# Patient Record
Sex: FEMALE | Race: OTHER | HISPANIC OR LATINO | URBAN - METROPOLITAN AREA
[De-identification: names, ages, dates, MRNs, and addresses within clinical notes are randomized per-mention and may not be internally consistent; named-entity substitution may affect disease eponyms.]

---

## 2017-06-29 ENCOUNTER — EMERGENCY (EMERGENCY)
Facility: HOSPITAL | Age: 57
LOS: 1 days | Discharge: HOME | End: 2017-06-29
Attending: EMERGENCY MEDICINE
Payer: COMMERCIAL

## 2017-06-29 VITALS
SYSTOLIC BLOOD PRESSURE: 114 MMHG | DIASTOLIC BLOOD PRESSURE: 74 MMHG | WEIGHT: 149.91 LBS | OXYGEN SATURATION: 100 % | HEIGHT: 65 IN | TEMPERATURE: 98 F | HEART RATE: 71 BPM | RESPIRATION RATE: 16 BRPM

## 2017-06-29 VITALS
DIASTOLIC BLOOD PRESSURE: 68 MMHG | HEART RATE: 68 BPM | OXYGEN SATURATION: 99 % | SYSTOLIC BLOOD PRESSURE: 120 MMHG | TEMPERATURE: 99 F | RESPIRATION RATE: 18 BRPM

## 2017-06-29 DIAGNOSIS — N30.01 ACUTE CYSTITIS WITH HEMATURIA: ICD-10-CM

## 2017-06-29 LAB
ALBUMIN SERPL ELPH-MCNC: 3.3 G/DL — LOW (ref 3.5–5)
ALP SERPL-CCNC: 72 U/L — SIGNIFICANT CHANGE UP (ref 40–120)
ALT FLD-CCNC: 18 U/L DA — SIGNIFICANT CHANGE UP (ref 10–60)
ANION GAP SERPL CALC-SCNC: 5 MMOL/L — SIGNIFICANT CHANGE UP (ref 5–17)
APPEARANCE UR: ABNORMAL
AST SERPL-CCNC: 19 U/L — SIGNIFICANT CHANGE UP (ref 10–40)
BILIRUB SERPL-MCNC: 0.4 MG/DL — SIGNIFICANT CHANGE UP (ref 0.2–1.2)
BILIRUB UR-MCNC: NEGATIVE — SIGNIFICANT CHANGE UP
BUN SERPL-MCNC: 9 MG/DL — SIGNIFICANT CHANGE UP (ref 7–18)
CALCIUM SERPL-MCNC: 9.2 MG/DL — SIGNIFICANT CHANGE UP (ref 8.4–10.5)
CHLORIDE SERPL-SCNC: 105 MMOL/L — SIGNIFICANT CHANGE UP (ref 96–108)
CO2 SERPL-SCNC: 27 MMOL/L — SIGNIFICANT CHANGE UP (ref 22–31)
COLOR SPEC: YELLOW — SIGNIFICANT CHANGE UP
CREAT SERPL-MCNC: 0.74 MG/DL — SIGNIFICANT CHANGE UP (ref 0.5–1.3)
DIFF PNL FLD: ABNORMAL
GLUCOSE SERPL-MCNC: 84 MG/DL — SIGNIFICANT CHANGE UP (ref 70–99)
GLUCOSE UR QL: NEGATIVE — SIGNIFICANT CHANGE UP
HCT VFR BLD CALC: 40.5 % — SIGNIFICANT CHANGE UP (ref 34.5–45)
HGB BLD-MCNC: 12.6 G/DL — SIGNIFICANT CHANGE UP (ref 11.5–15.5)
KETONES UR-MCNC: NEGATIVE — SIGNIFICANT CHANGE UP
LEUKOCYTE ESTERASE UR-ACNC: ABNORMAL
MCHC RBC-ENTMCNC: 26.7 PG — LOW (ref 27–34)
MCHC RBC-ENTMCNC: 31 GM/DL — LOW (ref 32–36)
MCV RBC AUTO: 86.1 FL — SIGNIFICANT CHANGE UP (ref 80–100)
NITRITE UR-MCNC: NEGATIVE — SIGNIFICANT CHANGE UP
PH UR: 6 — SIGNIFICANT CHANGE UP (ref 5–8)
PLATELET # BLD AUTO: 237 K/UL — SIGNIFICANT CHANGE UP (ref 150–400)
POTASSIUM SERPL-MCNC: 4.1 MMOL/L — SIGNIFICANT CHANGE UP (ref 3.5–5.3)
POTASSIUM SERPL-SCNC: 4.1 MMOL/L — SIGNIFICANT CHANGE UP (ref 3.5–5.3)
PROT SERPL-MCNC: 10.5 G/DL — HIGH (ref 6–8.3)
PROT UR-MCNC: NEGATIVE — SIGNIFICANT CHANGE UP
RBC # BLD: 4.7 M/UL — SIGNIFICANT CHANGE UP (ref 3.8–5.2)
RBC # FLD: 14.4 % — SIGNIFICANT CHANGE UP (ref 10.3–14.5)
SODIUM SERPL-SCNC: 137 MMOL/L — SIGNIFICANT CHANGE UP (ref 135–145)
SP GR SPEC: 1.01 — SIGNIFICANT CHANGE UP (ref 1.01–1.02)
UROBILINOGEN FLD QL: NEGATIVE — SIGNIFICANT CHANGE UP
WBC # BLD: 9.6 K/UL — SIGNIFICANT CHANGE UP (ref 3.8–10.5)
WBC # FLD AUTO: 9.6 K/UL — SIGNIFICANT CHANGE UP (ref 3.8–10.5)

## 2017-06-29 PROCEDURE — 80053 COMPREHEN METABOLIC PANEL: CPT

## 2017-06-29 PROCEDURE — 87186 SC STD MICRODIL/AGAR DIL: CPT

## 2017-06-29 PROCEDURE — 85027 COMPLETE CBC AUTOMATED: CPT

## 2017-06-29 PROCEDURE — 99284 EMERGENCY DEPT VISIT MOD MDM: CPT

## 2017-06-29 PROCEDURE — 99284 EMERGENCY DEPT VISIT MOD MDM: CPT | Mod: 25

## 2017-06-29 PROCEDURE — 96374 THER/PROPH/DIAG INJ IV PUSH: CPT

## 2017-06-29 PROCEDURE — 87086 URINE CULTURE/COLONY COUNT: CPT

## 2017-06-29 PROCEDURE — 96375 TX/PRO/DX INJ NEW DRUG ADDON: CPT

## 2017-06-29 PROCEDURE — 81001 URINALYSIS AUTO W/SCOPE: CPT

## 2017-06-29 RX ORDER — CEFUROXIME AXETIL 250 MG
1 TABLET ORAL
Qty: 20 | Refills: 0
Start: 2017-06-29 | End: 2017-07-09

## 2017-06-29 RX ORDER — KETOROLAC TROMETHAMINE 30 MG/ML
30 SYRINGE (ML) INJECTION ONCE
Qty: 0 | Refills: 0 | Status: DISCONTINUED | OUTPATIENT
Start: 2017-06-29 | End: 2017-06-29

## 2017-06-29 RX ORDER — PHENAZOPYRIDINE HCL 100 MG
100 TABLET ORAL ONCE
Qty: 0 | Refills: 0 | Status: COMPLETED | OUTPATIENT
Start: 2017-06-29 | End: 2017-06-29

## 2017-06-29 RX ORDER — CEFTRIAXONE 500 MG/1
1 INJECTION, POWDER, FOR SOLUTION INTRAMUSCULAR; INTRAVENOUS ONCE
Qty: 0 | Refills: 0 | Status: COMPLETED | OUTPATIENT
Start: 2017-06-29 | End: 2017-06-29

## 2017-06-29 RX ORDER — SODIUM CHLORIDE 9 MG/ML
1000 INJECTION INTRAMUSCULAR; INTRAVENOUS; SUBCUTANEOUS ONCE
Qty: 0 | Refills: 0 | Status: COMPLETED | OUTPATIENT
Start: 2017-06-29 | End: 2017-06-29

## 2017-06-29 RX ADMIN — Medication 30 MILLIGRAM(S): at 22:29

## 2017-06-29 RX ADMIN — Medication 30 MILLIGRAM(S): at 22:28

## 2017-06-29 RX ADMIN — Medication 100 MILLIGRAM(S): at 22:27

## 2017-06-29 RX ADMIN — SODIUM CHLORIDE 4000 MILLILITER(S): 9 INJECTION INTRAMUSCULAR; INTRAVENOUS; SUBCUTANEOUS at 22:27

## 2017-06-29 RX ADMIN — CEFTRIAXONE 100 GRAM(S): 500 INJECTION, POWDER, FOR SOLUTION INTRAMUSCULAR; INTRAVENOUS at 22:28

## 2017-06-29 NOTE — ED PROVIDER NOTE - OBJECTIVE STATEMENT
56 y/o female with PMHx of UTI presents to the ED c/o dysuria x today. Pt denies fever, chills or any other complaints. NKDA.

## 2017-06-29 NOTE — ED ADULT NURSE NOTE - OBJECTIVE STATEMENT
Pt states that she is having abd pain, with difficulty urinating that startted today int he am. Pt states that she is having chest pain, with sob that has been ongoing for years. denies nausea, vomiting.

## 2017-07-01 LAB
-  AMPICILLIN/SULBACTAM: SIGNIFICANT CHANGE UP
-  CEFAZOLIN: SIGNIFICANT CHANGE UP
-  CIPROFLOXACIN: SIGNIFICANT CHANGE UP
-  GENTAMICIN: SIGNIFICANT CHANGE UP
-  LEVOFLOXACIN: SIGNIFICANT CHANGE UP
-  NITROFURANTOIN: SIGNIFICANT CHANGE UP
-  OXACILLIN: SIGNIFICANT CHANGE UP
-  PENICILLIN: SIGNIFICANT CHANGE UP
-  RIFAMPIN: SIGNIFICANT CHANGE UP
-  TETRACYCLINE: SIGNIFICANT CHANGE UP
-  TRIMETHOPRIM/SULFAMETHOXAZOLE: SIGNIFICANT CHANGE UP
-  VANCOMYCIN: SIGNIFICANT CHANGE UP
CULTURE RESULTS: SIGNIFICANT CHANGE UP
METHOD TYPE: SIGNIFICANT CHANGE UP
ORGANISM # SPEC MICROSCOPIC CNT: SIGNIFICANT CHANGE UP
ORGANISM # SPEC MICROSCOPIC CNT: SIGNIFICANT CHANGE UP
SPECIMEN SOURCE: SIGNIFICANT CHANGE UP

## 2017-08-09 ENCOUNTER — EMERGENCY (EMERGENCY)
Facility: HOSPITAL | Age: 57
LOS: 1 days | Discharge: HOME | End: 2017-08-09
Attending: EMERGENCY MEDICINE
Payer: COMMERCIAL

## 2017-08-09 VITALS
DIASTOLIC BLOOD PRESSURE: 88 MMHG | TEMPERATURE: 98 F | RESPIRATION RATE: 18 BRPM | SYSTOLIC BLOOD PRESSURE: 126 MMHG | HEART RATE: 60 BPM | OXYGEN SATURATION: 98 %

## 2017-08-09 VITALS
SYSTOLIC BLOOD PRESSURE: 125 MMHG | HEART RATE: 62 BPM | WEIGHT: 160.06 LBS | TEMPERATURE: 98 F | OXYGEN SATURATION: 99 % | DIASTOLIC BLOOD PRESSURE: 73 MMHG | HEIGHT: 60 IN | RESPIRATION RATE: 18 BRPM

## 2017-08-09 DIAGNOSIS — Z98.891 HISTORY OF UTERINE SCAR FROM PREVIOUS SURGERY: Chronic | ICD-10-CM

## 2017-08-09 LAB
ALBUMIN SERPL ELPH-MCNC: 2.8 G/DL — LOW (ref 3.5–5)
ALP SERPL-CCNC: 81 U/L — SIGNIFICANT CHANGE UP (ref 40–120)
ALT FLD-CCNC: 12 U/L DA — SIGNIFICANT CHANGE UP (ref 10–60)
ANION GAP SERPL CALC-SCNC: 3 MMOL/L — LOW (ref 5–17)
APPEARANCE UR: CLEAR — SIGNIFICANT CHANGE UP
AST SERPL-CCNC: 20 U/L — SIGNIFICANT CHANGE UP (ref 10–40)
BACTERIA # UR AUTO: ABNORMAL /HPF
BASOPHILS # BLD AUTO: 0.1 K/UL — SIGNIFICANT CHANGE UP (ref 0–0.2)
BASOPHILS NFR BLD AUTO: 0.9 % — SIGNIFICANT CHANGE UP (ref 0–2)
BILIRUB SERPL-MCNC: 0.2 MG/DL — SIGNIFICANT CHANGE UP (ref 0.2–1.2)
BILIRUB UR-MCNC: NEGATIVE — SIGNIFICANT CHANGE UP
BUN SERPL-MCNC: 13 MG/DL — SIGNIFICANT CHANGE UP (ref 7–18)
CALCIUM SERPL-MCNC: 8.7 MG/DL — SIGNIFICANT CHANGE UP (ref 8.4–10.5)
CHLORIDE SERPL-SCNC: 109 MMOL/L — HIGH (ref 96–108)
CO2 SERPL-SCNC: 27 MMOL/L — SIGNIFICANT CHANGE UP (ref 22–31)
COLOR SPEC: YELLOW — SIGNIFICANT CHANGE UP
COMMENT - URINE: SIGNIFICANT CHANGE UP
CREAT SERPL-MCNC: 0.77 MG/DL — SIGNIFICANT CHANGE UP (ref 0.5–1.3)
DIFF PNL FLD: NEGATIVE — SIGNIFICANT CHANGE UP
EOSINOPHIL # BLD AUTO: 0.2 K/UL — SIGNIFICANT CHANGE UP (ref 0–0.5)
EOSINOPHIL NFR BLD AUTO: 3.1 % — SIGNIFICANT CHANGE UP (ref 0–6)
EPI CELLS # UR: ABNORMAL (ref 0–10)
GLUCOSE SERPL-MCNC: 117 MG/DL — HIGH (ref 70–99)
GLUCOSE UR QL: NEGATIVE — SIGNIFICANT CHANGE UP
HCT VFR BLD CALC: 36.2 % — SIGNIFICANT CHANGE UP (ref 34.5–45)
HGB BLD-MCNC: 11.3 G/DL — LOW (ref 11.5–15.5)
KETONES UR-MCNC: NEGATIVE — SIGNIFICANT CHANGE UP
LEUKOCYTE ESTERASE UR-ACNC: ABNORMAL
LIDOCAIN IGE QN: 209 U/L — SIGNIFICANT CHANGE UP (ref 73–393)
LYMPHOCYTES # BLD AUTO: 2.4 K/UL — SIGNIFICANT CHANGE UP (ref 1–3.3)
LYMPHOCYTES # BLD AUTO: 34.7 % — SIGNIFICANT CHANGE UP (ref 13–44)
MCHC RBC-ENTMCNC: 27.2 PG — SIGNIFICANT CHANGE UP (ref 27–34)
MCHC RBC-ENTMCNC: 31.2 GM/DL — LOW (ref 32–36)
MCV RBC AUTO: 87.3 FL — SIGNIFICANT CHANGE UP (ref 80–100)
MONOCYTES # BLD AUTO: 0.6 K/UL — SIGNIFICANT CHANGE UP (ref 0–0.9)
MONOCYTES NFR BLD AUTO: 9.2 % — SIGNIFICANT CHANGE UP (ref 2–14)
NEUTROPHILS # BLD AUTO: 3.6 K/UL — SIGNIFICANT CHANGE UP (ref 1.8–7.4)
NEUTROPHILS NFR BLD AUTO: 52.1 % — SIGNIFICANT CHANGE UP (ref 43–77)
NITRITE UR-MCNC: NEGATIVE — SIGNIFICANT CHANGE UP
PH UR: 5 — SIGNIFICANT CHANGE UP (ref 5–8)
PLATELET # BLD AUTO: 190 K/UL — SIGNIFICANT CHANGE UP (ref 150–400)
POTASSIUM SERPL-MCNC: 4.1 MMOL/L — SIGNIFICANT CHANGE UP (ref 3.5–5.3)
POTASSIUM SERPL-SCNC: 4.1 MMOL/L — SIGNIFICANT CHANGE UP (ref 3.5–5.3)
PROT SERPL-MCNC: 8.9 G/DL — HIGH (ref 6–8.3)
PROT UR-MCNC: NEGATIVE — SIGNIFICANT CHANGE UP
RBC # BLD: 4.14 M/UL — SIGNIFICANT CHANGE UP (ref 3.8–5.2)
RBC # FLD: 14.2 % — SIGNIFICANT CHANGE UP (ref 10.3–14.5)
RBC CASTS # UR COMP ASSIST: SIGNIFICANT CHANGE UP /HPF (ref 0–2)
SODIUM SERPL-SCNC: 139 MMOL/L — SIGNIFICANT CHANGE UP (ref 135–145)
SP GR SPEC: 1.01 — SIGNIFICANT CHANGE UP (ref 1.01–1.02)
UROBILINOGEN FLD QL: NEGATIVE — SIGNIFICANT CHANGE UP
WBC # BLD: 6.9 K/UL — SIGNIFICANT CHANGE UP (ref 3.8–10.5)
WBC # FLD AUTO: 6.9 K/UL — SIGNIFICANT CHANGE UP (ref 3.8–10.5)
WBC UR QL: ABNORMAL /HPF (ref 0–5)

## 2017-08-09 PROCEDURE — 85027 COMPLETE CBC AUTOMATED: CPT

## 2017-08-09 PROCEDURE — 74177 CT ABD & PELVIS W/CONTRAST: CPT | Mod: 26

## 2017-08-09 PROCEDURE — 83690 ASSAY OF LIPASE: CPT

## 2017-08-09 PROCEDURE — 81001 URINALYSIS AUTO W/SCOPE: CPT

## 2017-08-09 PROCEDURE — 99285 EMERGENCY DEPT VISIT HI MDM: CPT | Mod: 25

## 2017-08-09 PROCEDURE — 96374 THER/PROPH/DIAG INJ IV PUSH: CPT | Mod: 59

## 2017-08-09 PROCEDURE — 74177 CT ABD & PELVIS W/CONTRAST: CPT

## 2017-08-09 PROCEDURE — 99284 EMERGENCY DEPT VISIT MOD MDM: CPT | Mod: 25

## 2017-08-09 PROCEDURE — 93005 ELECTROCARDIOGRAM TRACING: CPT

## 2017-08-09 PROCEDURE — 80053 COMPREHEN METABOLIC PANEL: CPT

## 2017-08-09 RX ORDER — ACETAMINOPHEN 500 MG
1000 TABLET ORAL ONCE
Qty: 0 | Refills: 0 | Status: COMPLETED | OUTPATIENT
Start: 2017-08-09 | End: 2017-08-09

## 2017-08-09 RX ORDER — SODIUM CHLORIDE 9 MG/ML
1000 INJECTION INTRAMUSCULAR; INTRAVENOUS; SUBCUTANEOUS ONCE
Qty: 0 | Refills: 0 | Status: COMPLETED | OUTPATIENT
Start: 2017-08-09 | End: 2017-08-09

## 2017-08-09 RX ORDER — SODIUM CHLORIDE 9 MG/ML
1000 INJECTION INTRAMUSCULAR; INTRAVENOUS; SUBCUTANEOUS
Qty: 0 | Refills: 0 | Status: DISCONTINUED | OUTPATIENT
Start: 2017-08-09 | End: 2017-08-13

## 2017-08-09 RX ORDER — METRONIDAZOLE 500 MG
2000 TABLET ORAL ONCE
Qty: 0 | Refills: 0 | Status: COMPLETED | OUTPATIENT
Start: 2017-08-09 | End: 2017-08-09

## 2017-08-09 RX ORDER — CEPHALEXIN 500 MG
250 CAPSULE ORAL ONCE
Qty: 0 | Refills: 0 | Status: COMPLETED | OUTPATIENT
Start: 2017-08-09 | End: 2017-08-09

## 2017-08-09 RX ADMIN — Medication 250 MILLIGRAM(S): at 07:53

## 2017-08-09 RX ADMIN — Medication 30 MILLILITER(S): at 03:47

## 2017-08-09 RX ADMIN — Medication 2000 MILLIGRAM(S): at 07:53

## 2017-08-09 RX ADMIN — SODIUM CHLORIDE 125 MILLILITER(S): 9 INJECTION INTRAMUSCULAR; INTRAVENOUS; SUBCUTANEOUS at 07:56

## 2017-08-09 RX ADMIN — Medication 1000 MILLIGRAM(S): at 04:47

## 2017-08-09 RX ADMIN — SODIUM CHLORIDE 1000 MILLILITER(S): 9 INJECTION INTRAMUSCULAR; INTRAVENOUS; SUBCUTANEOUS at 03:47

## 2017-08-09 RX ADMIN — Medication 400 MILLIGRAM(S): at 04:03

## 2017-08-09 NOTE — ED PROVIDER NOTE - MEDICAL DECISION MAKING DETAILS
Pt with abd pain: will eval for pancreatitis vs. cholecystitis. Will order pain management, CT and reassess.

## 2017-08-09 NOTE — ED PROVIDER NOTE - OBJECTIVE STATEMENT
Son serves as  for pt: Lithuanian, declined . 58 y/o female with no significant PMHx and PSHx of , presents to the ED c/o abd pain and nausea x 4 days worsening today. Pt notes abd pain alleviated s/p eating, except when she eats fats. Pt reports she had chicken soup for dinner. Pt denies recent outside [place] travels, sick contacts or known spoiled food consumption. Pt also notes L forearm pain x 1 month, denies trauma or falls. Pt notes intermittent palpitations x 1 month, resolved currently. Pt denies CP, SOB,fever, chills, dizziness, blood in stool, melena, vomiting, diarrhea, dysuria, or any other complaints. NKDA.

## 2017-08-09 NOTE — ED PROVIDER NOTE - CARE PLAN
Principal Discharge DX:	Gastritis  Secondary Diagnosis:	Fibroid uterus  Secondary Diagnosis:	Trichomonal infection

## 2017-08-09 NOTE — ED PROVIDER NOTE - PROGRESS NOTE DETAILS
Dr. Bull Note: used , pt with improved pain and no nausea.  Explained to pt that she has enlarged uterus and needs to see gynecologist for further workup, she had trichomonas and needs antibx and further eval by pcp, has gastritis and diet changes and maalox, and palpitations f/u pcp.  Stable for [place] home.

## 2017-08-13 DIAGNOSIS — D25.9 LEIOMYOMA OF UTERUS, UNSPECIFIED: ICD-10-CM

## 2017-08-13 DIAGNOSIS — A59.01 TRICHOMONAL VULVOVAGINITIS: ICD-10-CM

## 2017-08-13 DIAGNOSIS — K29.70 GASTRITIS, UNSPECIFIED, WITHOUT BLEEDING: ICD-10-CM

## 2019-06-18 ENCOUNTER — EMERGENCY (EMERGENCY)
Facility: HOSPITAL | Age: 59
LOS: 1 days | Discharge: HOME | End: 2019-06-18
Attending: EMERGENCY MEDICINE
Payer: COMMERCIAL

## 2019-06-18 VITALS
WEIGHT: 149.91 LBS | HEIGHT: 62 IN | RESPIRATION RATE: 16 BRPM | TEMPERATURE: 98 F | OXYGEN SATURATION: 98 % | SYSTOLIC BLOOD PRESSURE: 121 MMHG | DIASTOLIC BLOOD PRESSURE: 75 MMHG | HEART RATE: 68 BPM

## 2019-06-18 DIAGNOSIS — Z98.891 HISTORY OF UTERINE SCAR FROM PREVIOUS SURGERY: Chronic | ICD-10-CM

## 2019-06-18 PROCEDURE — 99284 EMERGENCY DEPT VISIT MOD MDM: CPT | Mod: 25

## 2019-06-19 VITALS
RESPIRATION RATE: 16 BRPM | SYSTOLIC BLOOD PRESSURE: 101 MMHG | OXYGEN SATURATION: 100 % | HEART RATE: 59 BPM | TEMPERATURE: 98 F

## 2019-06-19 PROBLEM — N39.0 URINARY TRACT INFECTION, SITE NOT SPECIFIED: Chronic | Status: ACTIVE | Noted: 2017-07-02

## 2019-06-19 LAB
ALBUMIN SERPL ELPH-MCNC: 3.5 G/DL — SIGNIFICANT CHANGE UP (ref 3.5–5)
ALP SERPL-CCNC: 88 U/L — SIGNIFICANT CHANGE UP (ref 40–120)
ALT FLD-CCNC: 16 U/L DA — SIGNIFICANT CHANGE UP (ref 10–60)
ANION GAP SERPL CALC-SCNC: 5 MMOL/L — SIGNIFICANT CHANGE UP (ref 5–17)
APPEARANCE UR: ABNORMAL
AST SERPL-CCNC: 17 U/L — SIGNIFICANT CHANGE UP (ref 10–40)
BASOPHILS # BLD AUTO: 0.03 K/UL — SIGNIFICANT CHANGE UP (ref 0–0.2)
BASOPHILS NFR BLD AUTO: 0.5 % — SIGNIFICANT CHANGE UP (ref 0–2)
BILIRUB SERPL-MCNC: 0.3 MG/DL — SIGNIFICANT CHANGE UP (ref 0.2–1.2)
BILIRUB UR-MCNC: NEGATIVE — SIGNIFICANT CHANGE UP
BUN SERPL-MCNC: 13 MG/DL — SIGNIFICANT CHANGE UP (ref 7–18)
CALCIUM SERPL-MCNC: 9.3 MG/DL — SIGNIFICANT CHANGE UP (ref 8.4–10.5)
CHLORIDE SERPL-SCNC: 106 MMOL/L — SIGNIFICANT CHANGE UP (ref 96–108)
CO2 SERPL-SCNC: 28 MMOL/L — SIGNIFICANT CHANGE UP (ref 22–31)
COLOR SPEC: YELLOW — SIGNIFICANT CHANGE UP
CREAT SERPL-MCNC: 0.84 MG/DL — SIGNIFICANT CHANGE UP (ref 0.5–1.3)
DIFF PNL FLD: ABNORMAL
EOSINOPHIL # BLD AUTO: 0.11 K/UL — SIGNIFICANT CHANGE UP (ref 0–0.5)
EOSINOPHIL NFR BLD AUTO: 1.7 % — SIGNIFICANT CHANGE UP (ref 0–6)
GLUCOSE SERPL-MCNC: 99 MG/DL — SIGNIFICANT CHANGE UP (ref 70–99)
GLUCOSE UR QL: NEGATIVE — SIGNIFICANT CHANGE UP
HCG SERPL-ACNC: 3 MIU/ML — SIGNIFICANT CHANGE UP
HCG UR QL: NEGATIVE — SIGNIFICANT CHANGE UP
HCT VFR BLD CALC: 38.2 % — SIGNIFICANT CHANGE UP (ref 34.5–45)
HGB BLD-MCNC: 11.7 G/DL — SIGNIFICANT CHANGE UP (ref 11.5–15.5)
IMM GRANULOCYTES NFR BLD AUTO: 0.3 % — SIGNIFICANT CHANGE UP (ref 0–1.5)
KETONES UR-MCNC: NEGATIVE — SIGNIFICANT CHANGE UP
LEUKOCYTE ESTERASE UR-ACNC: ABNORMAL
LIDOCAIN IGE QN: 727 U/L — HIGH (ref 73–393)
LYMPHOCYTES # BLD AUTO: 2.99 K/UL — SIGNIFICANT CHANGE UP (ref 1–3.3)
LYMPHOCYTES # BLD AUTO: 46.4 % — HIGH (ref 13–44)
MCHC RBC-ENTMCNC: 26.9 PG — LOW (ref 27–34)
MCHC RBC-ENTMCNC: 30.6 GM/DL — LOW (ref 32–36)
MCV RBC AUTO: 87.8 FL — SIGNIFICANT CHANGE UP (ref 80–100)
MONOCYTES # BLD AUTO: 0.56 K/UL — SIGNIFICANT CHANGE UP (ref 0–0.9)
MONOCYTES NFR BLD AUTO: 8.7 % — SIGNIFICANT CHANGE UP (ref 2–14)
NEUTROPHILS # BLD AUTO: 2.73 K/UL — SIGNIFICANT CHANGE UP (ref 1.8–7.4)
NEUTROPHILS NFR BLD AUTO: 42.4 % — LOW (ref 43–77)
NITRITE UR-MCNC: NEGATIVE — SIGNIFICANT CHANGE UP
NRBC # BLD: 0 /100 WBCS — SIGNIFICANT CHANGE UP (ref 0–0)
PH UR: 5 — SIGNIFICANT CHANGE UP (ref 5–8)
PLATELET # BLD AUTO: 196 K/UL — SIGNIFICANT CHANGE UP (ref 150–400)
POTASSIUM SERPL-MCNC: 3.9 MMOL/L — SIGNIFICANT CHANGE UP (ref 3.5–5.3)
POTASSIUM SERPL-SCNC: 3.9 MMOL/L — SIGNIFICANT CHANGE UP (ref 3.5–5.3)
PROT SERPL-MCNC: 10.4 G/DL — HIGH (ref 6–8.3)
PROT UR-MCNC: 15
RBC # BLD: 4.35 M/UL — SIGNIFICANT CHANGE UP (ref 3.8–5.2)
RBC # FLD: 14.6 % — HIGH (ref 10.3–14.5)
SODIUM SERPL-SCNC: 139 MMOL/L — SIGNIFICANT CHANGE UP (ref 135–145)
SP GR SPEC: 1.03 — HIGH (ref 1.01–1.02)
UROBILINOGEN FLD QL: NEGATIVE — SIGNIFICANT CHANGE UP
WBC # BLD: 6.44 K/UL — SIGNIFICANT CHANGE UP (ref 3.8–10.5)
WBC # FLD AUTO: 6.44 K/UL — SIGNIFICANT CHANGE UP (ref 3.8–10.5)

## 2019-06-19 PROCEDURE — 74177 CT ABD & PELVIS W/CONTRAST: CPT | Mod: 26

## 2019-06-19 PROCEDURE — 99284 EMERGENCY DEPT VISIT MOD MDM: CPT | Mod: 25

## 2019-06-19 PROCEDURE — 83690 ASSAY OF LIPASE: CPT

## 2019-06-19 PROCEDURE — 85027 COMPLETE CBC AUTOMATED: CPT

## 2019-06-19 PROCEDURE — 36415 COLL VENOUS BLD VENIPUNCTURE: CPT

## 2019-06-19 PROCEDURE — 80053 COMPREHEN METABOLIC PANEL: CPT

## 2019-06-19 PROCEDURE — 87086 URINE CULTURE/COLONY COUNT: CPT

## 2019-06-19 PROCEDURE — 81025 URINE PREGNANCY TEST: CPT

## 2019-06-19 PROCEDURE — 74177 CT ABD & PELVIS W/CONTRAST: CPT

## 2019-06-19 PROCEDURE — 84702 CHORIONIC GONADOTROPIN TEST: CPT

## 2019-06-19 PROCEDURE — 81001 URINALYSIS AUTO W/SCOPE: CPT

## 2019-06-19 NOTE — ED PROVIDER NOTE - NSFOLLOWUPINSTRUCTIONS_ED_ALL_ED_FT
Followup with GYN regarding large fibroid.  Followup with GI specialist regarding elevated lipase.    Return to ED if you develop worsening pain or severe vomiting/inability to keep food/liquids down.

## 2019-06-19 NOTE — ED PROVIDER NOTE - OBJECTIVE STATEMENT
60 y/o female with no significant PMHx presents to the ED c/o increased abd distention and pain x 4 months. Pt notes it feels like something is moving around in her abd. Pt notes her LMP x 4 years ago but pt is sexually active. Pt took a home pregnancy test, came back negative. Pt notes associated nausea. Pt denies vomiting, diarrhea, urinary Sx, or any other complaints. Last nml BM x early yesterday. NKDA.

## 2019-06-19 NOTE — ED ADULT NURSE NOTE - NSIMPLEMENTINTERV_GEN_ALL_ED
Implemented All Fall Risk Interventions:  [place] to call system. Call bell, personal items and telephone within reach. Instruct patient to call for assistance. Room bathroom lighting operational. Non-slip footwear when patient is off stretcher. Physically safe environment: no spills, clutter or unnecessary equipment. Stretcher in lowest position, wheels locked, appropriate side rails in place. Provide visual cue, wrist band, yellow gown, etc. Monitor gait and stability. Monitor for mental status changes and reorient to person, place, and time. Review medications for side effects contributing to fall risk. Reinforce activity limits and safety measures with patient and family.

## 2019-06-19 NOTE — ED PROVIDER NOTE - CARE PROVIDER_API CALL
Rob Casper)  Gastroenterology; Internal Medicine  [address] [place] [address]  Phone: [phone]  Fax: [phone]  Follow Up Time:

## 2019-06-19 NOTE — ED PROVIDER NOTE - CLINICAL SUMMARY MEDICAL DECISION MAKING FREE TEXT BOX
59 F with abd distention and pain. Labs and UA. If not pregnant, CT abd eval for likely mass and reassess. 59 F with abd distention and pain. Labs and UA. If not pregnant, CT abd eval for likely mass and reassess.    labs shows lipase 727, UA shows contaminated sample  CT shows Huge uterine fibroid without interval change. No acute intra-abdominal abnormality.  Discussed above with patient. On reeval patient well-appearing, tolerating po w/o vomiting. 59 F with abd distention and pain. Labs and UA. If not pregnant, CT abd eval for likely mass and reassess.    labs shows lipase 727, UA shows contaminated sample  CT shows Huge uterine fibroid without interval change. No acute intra-abdominal abnormality.  Discussed above with patient. On reeval patient well-appearing, tolerating po w/o vomiting. Patient stable for discharge to f/u with GYN and GI as outpatient. Given careful return to ED precautions.

## 2019-06-19 NOTE — ED PROVIDER NOTE - NSFOLLOWUPCLINICS_GEN_ALL_ED_FT
[facility]  [address] [place] [address]  Phone: [phone]  Fax: [phone]  Follow Up Time:

## 2019-06-20 LAB
CULTURE RESULTS: SIGNIFICANT CHANGE UP
SPECIMEN SOURCE: SIGNIFICANT CHANGE UP

## 2019-06-25 PROBLEM — Z00.00 ENCOUNTER FOR PREVENTIVE HEALTH EXAMINATION: Status: ACTIVE | Noted: 2019-06-25

## 2019-06-26 ENCOUNTER — APPOINTMENT (OUTPATIENT)
Dept: GASTROENTEROLOGY | Facility: CLINIC | Age: 59
End: 2019-06-26
Payer: MEDICAID

## 2019-06-26 ENCOUNTER — LABORATORY RESULT (OUTPATIENT)
Age: 59
End: 2019-06-26

## 2019-06-26 VITALS
TEMPERATURE: 98.3 F | OXYGEN SATURATION: 97 % | DIASTOLIC BLOOD PRESSURE: 66 MMHG | HEIGHT: 62 IN | BODY MASS INDEX: 26.13 KG/M2 | SYSTOLIC BLOOD PRESSURE: 102 MMHG | WEIGHT: 142 LBS | HEART RATE: 69 BPM

## 2019-06-26 DIAGNOSIS — K21.9 GASTRO-ESOPHAGEAL REFLUX DISEASE W/OUT ESOPHAGITIS: ICD-10-CM

## 2019-06-26 DIAGNOSIS — R13.10 DYSPHAGIA, UNSPECIFIED: ICD-10-CM

## 2019-06-26 DIAGNOSIS — Z86.79 PERSONAL HISTORY OF OTHER DISEASES OF THE CIRCULATORY SYSTEM: ICD-10-CM

## 2019-06-26 DIAGNOSIS — Z87.891 PERSONAL HISTORY OF NICOTINE DEPENDENCE: ICD-10-CM

## 2019-06-26 DIAGNOSIS — Z82.61 FAMILY HISTORY OF ARTHRITIS: ICD-10-CM

## 2019-06-26 DIAGNOSIS — K30 FUNCTIONAL DYSPEPSIA: ICD-10-CM

## 2019-06-26 DIAGNOSIS — R11.0 NAUSEA: ICD-10-CM

## 2019-06-26 DIAGNOSIS — R07.89 OTHER CHEST PAIN: ICD-10-CM

## 2019-06-26 DIAGNOSIS — Z83.79 FAMILY HISTORY OF OTHER DISEASES OF THE DIGESTIVE SYSTEM: ICD-10-CM

## 2019-06-26 DIAGNOSIS — R74.8 ABNORMAL LEVELS OF OTHER SERUM ENZYMES: ICD-10-CM

## 2019-06-26 DIAGNOSIS — Z78.9 OTHER SPECIFIED HEALTH STATUS: ICD-10-CM

## 2019-06-26 DIAGNOSIS — R10.84 GENERALIZED ABDOMINAL PAIN: ICD-10-CM

## 2019-06-26 PROCEDURE — 99204 OFFICE O/P NEW MOD 45 MIN: CPT

## 2019-06-26 RX ORDER — POLYETHYLENE GLYCOL 3350, SODIUM CHLORIDE, SODIUM BICARBONATE AND POTASSIUM CHLORIDE WITH LEMON FLAVOR 420; 11.2; 5.72; 1.48 G/4L; G/4L; G/4L; G/4L
420 POWDER, FOR SOLUTION ORAL
Qty: 1 | Refills: 0 | Status: ACTIVE | COMMUNITY
Start: 2019-06-26 | End: 1900-01-01

## 2019-06-26 RX ORDER — PANTOPRAZOLE 40 MG/1
40 TABLET, DELAYED RELEASE ORAL DAILY
Qty: 30 | Refills: 3 | Status: ACTIVE | COMMUNITY
Start: 2019-06-26 | End: 1900-01-01

## 2019-06-26 NOTE — HISTORY OF PRESENT ILLNESS
[ER Visit] : was seen in the Emergency Department [Vomiting] : denies vomiting [Diarrhea] : denies diarrhea [Constipation] : denies constipation [Yellow Skin Or Eyes (Jaundice)] : denies jaundice [Rectal Pain] : denies rectal pain [Heartburn] : heartburn [Nausea] : nausea [Abdominal Pain] : abdominal pain [Abdominal Swelling] : abdominal swelling [GERD] : gastroesophageal reflux disease [Wt Gain ___ Lbs] : no recent weight gain [Wt Loss ___ Lbs] : no recent weight loss [Hiatus Hernia] : no hiatus hernia [Peptic Ulcer Disease] : no peptic ulcer disease [Pancreatitis] : no pancreatitis [Cholelithiasis] : no cholelithiasis [Kidney Stone] : no kidney stone [Irritable Bowel Syndrome] : no irritable bowel syndrome [Inflammatory Bowel Disease] : no inflammatory bowel disease [Diverticulitis] : no diverticulitis [Alcohol Abuse] : no alcohol abuse [Malignancy] : no malignancy [Abdominal Surgery] : no abdominal surgery [Appendectomy] : no appendectomy [Cholecystectomy] : no cholecystectomy [de-identified] : The patient is a 59-year-old  female with no significant past medical history who was referred to my office by Dr. Raheel Matamoros for abdominal pain, dyspepsia, gastroesophageal reflux disease, dysphagia, atypical chest pain and nausea.  The patient also admits to having a father that  of a pancreatic disorder. I was asked to render an opinion for consultation for the above complaints.   The patient states that she is feeling uncomfortable x 5 months.  The patient was evaluated at [facility] emergency room on 2019 for abdominal pain.  The patient's  blood work performed on 2019 revealed an elevated lipase of 727 U/L, an elevated total protein of 10.4 g/dl.  The CAT scan of the abdomen and pelvis with IV contrast performed on 2019 revealed huge uterine fibroids without interval change but no acute intrabdominal abnormalities.  The patient was discharged on no medications and to followup for GI evaluation.  The patient complains of abdominal pain.  The patient describes the abdominal pain as a crampy, burning, intermittent midepigastric discomfort that is nonradiating in nature.  The abdominal pain is unrelated to meals or passing gas or having bowel movements. The abdominal pain is described as being mild to moderate in nature.  The abdominal pain occurs at night and in the morning.  The abdominal pain can occur at any time.   The abdominal pain never has awakened the patient from sleep.  The abdominal pain is not relieved with any medications.  The abdominal pain is associated with abdominal gas and bloating.  The patient complains of nausea but denies any vomiting.  The patient complains of gastroesophageal reflux disease and dysphagia. The dysphagia is worse with solids but unrelated to liquids. The gastroesophageal reflux disease is worse after meals and late at night and in the early morning. The patient denies taking medications for the gastroesophageal reflux disease such as proton pump inhibitors, H2 blockers or antacids.  The patient complains of atypical chest pain but denies any shortness of breath or palpitations.  The chest pain is described as a pressure, intermittent left sided chest discomfort that is nonradiating in nature.  The patient denies any diaphoresis. The chest pain is described as being 10 out of 10 in intensity.  The chest pain can occur at any time.  The chest pain is worse at night and early morning.  The chest pain improves with passing gas.  The chest pain is unrelated to meals.  The chest pain never awakened the patient from sleep.  According to the son, the patient was evaluated by a cardiologist.  According to the patient, the cardiac evaluation was unremarkable. The patient denies any constipation or diarrhea.  The patient has 1 to 2 bowel movements a day.  The patient denies a change in bowel habits.  The patient denies a change in caliber of stool.  The patient denies having mucus discharge with the bowel movements.  The patient denies any bright red blood per rectum, melena or hematemesis.  The patient denies any rectal pain or rectal pruritus. The patient denies any weight loss or anorexia.  She denies any fevers or chills.  The patient denies any jaundice or pruritus.  The patient denies any back pain.  The patient denies ever having a prior upper endoscopy and colonoscopy performed by another gastroenterologist.  The patient's last menstrual period was age 54. The patient is a .  The patient's first menstrual period was at age 12. The patient admits to a family history of GI problems.  The patient’s father had a history of pancreatitis.

## 2019-06-26 NOTE — ASSESSMENT
[FreeTextEntry1] : Abdominal Pain: The patient complains of abdominal pain. The patient is to avoid nonsteroidal anti-inflammatory drugs and aspirin.  I recommend a trial of Phazyme 1 tablet PO 3 times a day for 3 months for the symptoms.\par Dyspepsia: The patient complains of dyspeptic symptoms.  The patient was advised to abide by an anti-gas diet.  The patient was given a pamphlet for anti-gas.  The patient and I reviewed the anti-gas diet at length. The patient is to start on a trial of Phazyme one tablet 3 times a day p.r.n. abdominal pain and gas.\par GERD: The patient was advised to avoid late-night meals and dietary indiscretions.  The patient was advised to avoid fried and fatty foods.  The patient was advised to abide by an anti-GERD diet. The patient was given a pamphlet for anti-GERD.  The patient and I reviewed the anti-GERD diet at length. I recommend a trial of Pantoprazole 40 mg once a day x 3 months for the symptoms.\par Dysphagia: The patient complains of dysphagia of unclear etiology.   The dysphagia is worse with meals but not with liquids.   If the symptoms persist despite medications and if the upper endoscopy is unremarkable, the patient may require motility studies to assess the etiology of the dysphagia.  The patient agrees and will follow-up for further work-up and treatment.\par Atypical Chest Pain: The patient complains of atypical chest pain of unclear etiology.  The patient was advised to follow up with the PMD and cardiologist regarding evaluation for the atypical chest pain. The patient was told of possible etiologies such as cardiac, pulmonary, GI, musculoskeletal, stress and other causes for the atypical chest pain.  The patient agrees and will follow-up with the PMD and cardiologist. \par Nausea: The patient complains of nausea. If the symptoms of nausea persists, the patient may require a trial of Zofran 8 mg twice a day. \par Elevated Lipase Level:the patient was found to have an elevated lipase level of 727 U/L on recent blood work in the emergency  room for abdominal pain.  The patient denies any alcohol use or history of pancreatitis.  The patient has a family history of  pancreatitis.  I recommend a repeat blood work to assess amylase/lipase level.  I also recommend blood work to assess for an occult pancreatic lesion.\par I recommend a gynecological consultation for the fibroids noted on recent CAT scan of the abdomen and pelvis.\par Blood Work: I recommend a CBC, SMA 24, amylase, lipase, ESR, TFTs, SUNIL, rheumatoid factor, celiac sprue panel, IgA, profile for hepatitis A, B, C., iron, TIBC, ferritin level, CEA and CA 19-9. \par Upper Endoscopy and Colonoscopy: I recommend an upper endoscopy and colonoscopy to assess the symptoms.  The patient was told of the risks and benefits of the procedure.  The patient was told of the risks of perforation, emergency surgery, bleeding, infections and missed lesions.  The patient agreed and will schedule for the procedure.  The patient is to be n.p.o. after midnight and bowel prep was given.  The patient is to return for the procedure. \par The patient is to follow-up in the office in 2 to 3 weeks to reassess the symptoms. The patient was told to call the office if any further problems. \par \par \par \par

## 2019-06-26 NOTE — REVIEW OF SYSTEMS
[Feeling Tired] : feeling tired [Eyesight Problems] : eyesight problems [Abdominal Pain] : abdominal pain [Chest Pain] : chest pain [Heartburn] : heartburn [Dizziness] : dizziness [Depression] : depression [Anxiety] : anxiety [Hot Flashes] : hot flashes [Negative] : Heme/Lymph [de-identified] : skin rash

## 2019-06-27 ENCOUNTER — MESSAGE (OUTPATIENT)
Age: 59
End: 2019-06-27

## 2019-06-27 LAB
AMYLASE/CREAT SERPL: 121 U/L
CANCER AG19-9 SERPL-ACNC: 13 U/ML
CEA SERPL-MCNC: 1.2 NG/ML
ERYTHROCYTE [SEDIMENTATION RATE] IN BLOOD BY WESTERGREN METHOD: 105 MM/HR
FERRITIN SERPL-MCNC: 207 NG/ML
HAV IGM SER QL: NONREACTIVE
HBV CORE IGM SER QL: NONREACTIVE
HBV SURFACE AG SER QL: NONREACTIVE
HCV AB SER QL: NONREACTIVE
HCV S/CO RATIO: 0.06 S/CO
HEMOCCULT STL QL: NEGATIVE
HEPATITIS A IGG ANTIBODY: REACTIVE
IGA SER QL IEP: 369 MG/DL
IRON SATN MFR SERPL: 28 %
IRON SERPL-MCNC: 81 UG/DL
LPL SERPL-CCNC: 41 U/L
RHEUMATOID FACT SER QL: 13 IU/ML
TIBC SERPL-MCNC: 290 UG/DL
TSH SERPL-ACNC: 2.25 UIU/ML
UIBC SERPL-MCNC: 209 UG/DL

## 2019-06-28 LAB
ANA PAT FLD IF-IMP: ABNORMAL
ANA SER IF-ACNC: ABNORMAL
HBV E AB SER QL: NEGATIVE
HBV E AG SER QL: NEGATIVE

## 2019-07-02 LAB
CELIAC DISEASE INTERPRETATION: NORMAL
CELIAC GENE PAIRS PRESENT: YES
DQ ALPHA 1: NORMAL
DQ BETA 1: NORMAL
IMMUNOGLOBULIN A (IGA): 423 MG/DL

## 2019-07-31 ENCOUNTER — APPOINTMENT (OUTPATIENT)
Dept: OBGYN | Facility: CLINIC | Age: 59
End: 2019-07-31

## 2019-09-09 ENCOUNTER — RESULT REVIEW (OUTPATIENT)
Age: 59
End: 2019-09-09

## 2019-09-10 ENCOUNTER — OUTPATIENT (OUTPATIENT)
Dept: OUTPATIENT SERVICES | Facility: HOSPITAL | Age: 59
LOS: 1 days | End: 2019-09-10
Payer: COMMERCIAL

## 2019-09-10 ENCOUNTER — APPOINTMENT (OUTPATIENT)
Dept: GASTROENTEROLOGY | Facility: HOSPITAL | Age: 59
End: 2019-09-10

## 2019-09-10 DIAGNOSIS — Z98.891 HISTORY OF UTERINE SCAR FROM PREVIOUS SURGERY: Chronic | ICD-10-CM

## 2019-09-10 DIAGNOSIS — K30 FUNCTIONAL DYSPEPSIA: ICD-10-CM

## 2019-09-10 DIAGNOSIS — R10.84 GENERALIZED ABDOMINAL PAIN: ICD-10-CM

## 2019-09-10 DIAGNOSIS — K21.9 GASTRO-ESOPHAGEAL REFLUX DISEASE WITHOUT ESOPHAGITIS: ICD-10-CM

## 2019-09-10 DIAGNOSIS — R13.10 DYSPHAGIA, UNSPECIFIED: ICD-10-CM

## 2019-09-10 DIAGNOSIS — R11.0 NAUSEA: ICD-10-CM

## 2019-09-10 PROCEDURE — G0121: CPT

## 2019-09-10 PROCEDURE — 88305 TISSUE EXAM BY PATHOLOGIST: CPT | Mod: 26

## 2019-09-10 PROCEDURE — 88305 TISSUE EXAM BY PATHOLOGIST: CPT

## 2019-09-10 PROCEDURE — 88312 SPECIAL STAINS GROUP 1: CPT | Mod: 26

## 2019-09-10 PROCEDURE — 43239 EGD BIOPSY SINGLE/MULTIPLE: CPT

## 2019-09-10 PROCEDURE — 88312 SPECIAL STAINS GROUP 1: CPT

## 2019-09-10 PROCEDURE — G0121 COLON CA SCRN NOT HI RSK IND: CPT

## 2019-09-12 LAB — SURGICAL PATHOLOGY STUDY: SIGNIFICANT CHANGE UP

## 2019-10-02 ENCOUNTER — APPOINTMENT (OUTPATIENT)
Dept: GASTROENTEROLOGY | Facility: CLINIC | Age: 59
End: 2019-10-02

## 2019-10-16 ENCOUNTER — APPOINTMENT (OUTPATIENT)
Dept: OBGYN | Facility: CLINIC | Age: 59
End: 2019-10-16

## 2020-10-01 NOTE — ED PROVIDER NOTE - NS ED NOTE AC HIGH RISK COUNTRIES
Patient ID: Jarrett is a 89 year old male.    Chief Complaint   Patient presents with   • Neuropathy       HPI  This is a very pleasant 89 yo male here with his wife to transition care from Dr Mckay and ANALI Garcia to me for a history of postherpetic neuralgia and polyneuropathy.    In 2011 he had herpes zoster ophthalmicus involving the R side of his face.  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  He used to have intense facial pain, which has substantially improved with Lyrica.  Now, he takes Lyrica 200mg TID for pain relief and feels this is stable/controlled.  He would like to have this refilled.    Separately, he has a history of polyneuropathy due to chemotherapy treatment over 15 years ago.  He says that following chemo his feet became numb and he has had imbalance ever since then.  He now uses a walker or wheelchair.  His feet feel constantly numb but are not painful.    He still follows regularly with heme/onc and gets frequent blood draws.  He makes it clear to me that he prefers to be managed conservatively and is not looking for any new treatments given his age.    Patient Active Problem List   Diagnosis   • Postherpetic neuralgia   • Polyneuropathy following chemotherapy (CMS/HCC)       Past Surgical History:   Procedure Laterality Date   • Appendectomy     • Colonoscopy     • Tonsillectomy         Family History   Problem Relation Age of Onset   • Cancer Mother    • Leukemia Father    • Hypertension Brother        Social History     Socioeconomic History   • Marital status: /Civil Union     Spouse name: Not on file   • Number of children: Not on file   • Years of education: Not on file   • Highest education level: Not on file   Occupational History   • Not on file   Social Needs   • Financial resource strain: Not on file   • Food insecurity     Worry: Not on file     Inability: Not on file   • Transportation needs     Medical: Not on file      Non-medical: Not on file   Tobacco Use   • Smoking status: Former Smoker     Packs/day: 0.00   • Smokeless tobacco: Former User   Substance and Sexual Activity   • Alcohol use: Yes     Comment: occasional   • Drug use: Not on file   • Sexual activity: Not on file   Lifestyle   • Physical activity     Days per week: Not on file     Minutes per session: Not on file   • Stress: Not on file   Relationships   • Social connections     Talks on phone: Not on file     Gets together: Not on file     Attends Protestant service: Not on file     Active member of club or organization: Not on file     Attends meetings of clubs or organizations: Not on file     Relationship status: Not on file   • Intimate partner violence     Fear of current or ex partner: Not on file     Emotionally abused: Not on file     Physically abused: Not on file     Forced sexual activity: Not on file   Other Topics Concern   • Not on file   Social History Narrative   • Not on file       Current Outpatient Medications   Medication Sig Dispense Refill   • hydroxyurea (HYDREA) 500 MG capsule      • LYRICA 200 MG capsule TAKE 1 CAPSULE BY MOUTH THREE TIMES DAILY 270 capsule 1   • bumetanide (BUMEX) 0.5 MG tablet Take 5 mg by mouth daily.     • COMBIGAN 0.2-0.5 % ophthalmic solution      • dorzolamide (TRUSOPT) 2 % ophthalmic solution      • dutasteride (AVODART) 0.5 MG capsule Take 0.5 mg by mouth daily.     • folic acid (FOLATE) 1 MG tablet      • simvastatin (ZOCOR) 40 MG tablet Take 40 mg by mouth daily.     • SYNTHROID 100 MCG tablet Take 100 mcg by mouth daily.     • Multiple Vitamins-Minerals (CENTRUM ULTRA MENS) Tab      • potassium CHLORIDE (KLOR-CON M) 20 MEQ frank ER tablet Take 20 mEq by mouth daily.     • Loteprednol Etabonate (LOTEMAX OP)      • pregabalin (LYRICA) 200 MG capsule Take 1 capsule by mouth 3 times daily. 270 capsule 1   • RHOPRESSA 0.02 % Solution      • tamsulosin (FLOMAX) 0.4 MG Cap Take 0.4 mg by mouth daily. At bedtime     •  Omega-3 Fatty Acids (FISH OIL) 1000 MG capsule Take 2 g by mouth daily.       No current facility-administered medications for this visit.        ALLERGIES:  No Known Allergies    Review of Systems   Constitutional: Positive for fatigue.   Musculoskeletal: Positive for gait problem.   Neurological: Positive for numbness.       Visit Vitals  /60 (BP Location: RUE - Right upper extremity, Patient Position: Sitting, Cuff Size: Large Adult)   Ht 5' 7\" (1.702 m)   Wt 83.9 kg (185 lb)   BMI 28.98 kg/m²       Physical Exam  General: awake, alert, breathing comfortably and in no apparent distress, elderly male seated in a chair wearing a mask  Psych: normal affect, judgement and insight appropriate  Mental status: lucid, conversant, answers questions appropriately, fluent and follows commands  Cranial nerves: anisocoria with R pupil dilated and unreactive, full extraocular movements, normal visual fields, no facial droop  Motor: normal gross motor strength in all 4 limbs with no lateralizing weakness. Some atrophy in foot musculature  Sensory:diminished pinprick and absent vibration in the feet and ankles, normal in hands and at knees  Coordination: no tremor or dysmetria  Gait: walks cautiously with a walker      Problem List Items Addressed This Visit        Nervous    Postherpetic neuralgia    Polyneuropathy following chemotherapy (CMS/HCC) - Primary        88 yo male with a history of postherpetic neuralgia involving the right V1 distribution of the face, and chemotherapy induced polyneuropathy.  These entities were discussed with him at length.  He is not interested in any other treatments or testing at this time and feels Lyrica controls his symptoms.    Plan  Lyrica 200mg TID renewed  F/u 1 year or as needed    Time Spent: 35 minutes  Greater than 50% of the time was spent in counseling and coordinating the patient's care.    Enrique Hinojosa, DO   No

## 2021-01-13 NOTE — ED ADULT NURSE NOTE - CAS TRG GENERAL NORM CIRC DET
OB Progress Note:  PPD#1    S: 35yo  PPD#1 s/p . Patient feels well. Pain is well controlled, tolerating regular diet,  voiding spontaneously, ambulating without difficulty. Denies heavy vaginal bleeding, CP/SOB, N/V, lightheadedness/dizziness.     O:  Vitals:  Vital Signs Last 24 Hrs  T(C): 36.4 (2021 05:38), Max: 36.7 (2021 17:39)  T(F): 97.6 (2021 05:38), Max: 98 (2021 17:39)  HR: 72 (2021 05:38) (64 - 73)  BP: 116/58 (2021 05:38) (99/58 - 116/58)  BP(mean): --  RR: 14 (2021 05:38) (14 - 17)  SpO2: 97% (2021 05:38) (96% - 100%)    MEDICATIONS  (STANDING):  acetaminophen   Tablet .. 975 milliGRAM(s) Oral <User Schedule>  diphtheria/tetanus/pertussis (acellular) Vaccine (ADAcel) 0.5 milliLiter(s) IntraMuscular once  enoxaparin Injectable 40 milliGRAM(s) SubCutaneous daily  ibuprofen  Tablet. 600 milliGRAM(s) Oral every 6 hours  prenatal multivitamin 1 Tablet(s) Oral daily  sodium chloride 0.9% lock flush 3 milliLiter(s) IV Push every 8 hours      Labs:  Blood type: A Positive  Rubella IgG: RPR: Negative                          11.6   -- >-----------< --    (  @ 06:44 )             35.0                        12.4   9.66 >-----------< 209    (  @ 15:58 )             37.2    21 @ 15:58      136  |  102  |  7   ----------------------------<  99  3.7   |  24  |  0.63        Ca    9.5      2021 15:58    TPro  6.6  /  Alb  3.7  /  TBili  <0.2  /  DBili  x   /  AST  15  /  ALT  14  /  AlkPhos  256<H>  21 @ 15:58          Physical Exam:  General: NAD  Abdomen: soft, non-tender, non-distended, fundus firm  Vaginal: No heavy vaginal bleeding      
Strong peripheral pulses

## 2021-11-19 ENCOUNTER — EMERGENCY (EMERGENCY)
Facility: HOSPITAL | Age: 61
LOS: 1 days | Discharge: HOME | End: 2021-11-19
Attending: EMERGENCY MEDICINE
Payer: COMMERCIAL

## 2021-11-19 VITALS
HEIGHT: 62 IN | TEMPERATURE: 98 F | SYSTOLIC BLOOD PRESSURE: 105 MMHG | OXYGEN SATURATION: 98 % | DIASTOLIC BLOOD PRESSURE: 70 MMHG | HEART RATE: 88 BPM | RESPIRATION RATE: 18 BRPM | WEIGHT: 125 LBS

## 2021-11-19 DIAGNOSIS — Z98.891 HISTORY OF UTERINE SCAR FROM PREVIOUS SURGERY: Chronic | ICD-10-CM

## 2021-11-19 LAB
APPEARANCE UR: ABNORMAL
BASOPHILS # BLD AUTO: 0.03 K/UL — SIGNIFICANT CHANGE UP (ref 0–0.2)
BASOPHILS NFR BLD AUTO: 0.2 % — SIGNIFICANT CHANGE UP (ref 0–2)
BILIRUB UR-MCNC: NEGATIVE — SIGNIFICANT CHANGE UP
COLOR SPEC: YELLOW — SIGNIFICANT CHANGE UP
DIFF PNL FLD: ABNORMAL
EOSINOPHIL # BLD AUTO: 0.05 K/UL — SIGNIFICANT CHANGE UP (ref 0–0.5)
EOSINOPHIL NFR BLD AUTO: 0.4 % — SIGNIFICANT CHANGE UP (ref 0–6)
GLUCOSE UR QL: NEGATIVE — SIGNIFICANT CHANGE UP
HCT VFR BLD CALC: 31.8 % — LOW (ref 34.5–45)
HGB BLD-MCNC: 10.3 G/DL — LOW (ref 11.5–15.5)
IMM GRANULOCYTES NFR BLD AUTO: 1 % — SIGNIFICANT CHANGE UP (ref 0–1.5)
KETONES UR-MCNC: NEGATIVE — SIGNIFICANT CHANGE UP
LEUKOCYTE ESTERASE UR-ACNC: ABNORMAL
LYMPHOCYTES # BLD AUTO: 1.89 K/UL — SIGNIFICANT CHANGE UP (ref 1–3.3)
LYMPHOCYTES # BLD AUTO: 15.5 % — SIGNIFICANT CHANGE UP (ref 13–44)
MCHC RBC-ENTMCNC: 25.7 PG — LOW (ref 27–34)
MCHC RBC-ENTMCNC: 32.4 GM/DL — SIGNIFICANT CHANGE UP (ref 32–36)
MCV RBC AUTO: 79.3 FL — LOW (ref 80–100)
MONOCYTES # BLD AUTO: 1.02 K/UL — HIGH (ref 0–0.9)
MONOCYTES NFR BLD AUTO: 8.4 % — SIGNIFICANT CHANGE UP (ref 2–14)
NEUTROPHILS # BLD AUTO: 9.08 K/UL — HIGH (ref 1.8–7.4)
NEUTROPHILS NFR BLD AUTO: 74.5 % — SIGNIFICANT CHANGE UP (ref 43–77)
NITRITE UR-MCNC: NEGATIVE — SIGNIFICANT CHANGE UP
NRBC # BLD: 0 /100 WBCS — SIGNIFICANT CHANGE UP (ref 0–0)
PH UR: 5 — SIGNIFICANT CHANGE UP (ref 5–8)
PLATELET # BLD AUTO: 126 K/UL — LOW (ref 150–400)
PROT UR-MCNC: 30 MG/DL
RBC # BLD: 4.01 M/UL — SIGNIFICANT CHANGE UP (ref 3.8–5.2)
RBC # FLD: 17.8 % — HIGH (ref 10.3–14.5)
SP GR SPEC: 1.01 — SIGNIFICANT CHANGE UP (ref 1.01–1.02)
UROBILINOGEN FLD QL: NEGATIVE — SIGNIFICANT CHANGE UP
WBC # BLD: 12.19 K/UL — HIGH (ref 3.8–10.5)
WBC # FLD AUTO: 12.19 K/UL — HIGH (ref 3.8–10.5)

## 2021-11-19 PROCEDURE — 99285 EMERGENCY DEPT VISIT HI MDM: CPT

## 2021-11-19 RX ORDER — KETOROLAC TROMETHAMINE 30 MG/ML
30 SYRINGE (ML) INJECTION ONCE
Refills: 0 | Status: DISCONTINUED | OUTPATIENT
Start: 2021-11-19 | End: 2021-11-19

## 2021-11-19 RX ORDER — SODIUM CHLORIDE 9 MG/ML
1000 INJECTION INTRAMUSCULAR; INTRAVENOUS; SUBCUTANEOUS ONCE
Refills: 0 | Status: COMPLETED | OUTPATIENT
Start: 2021-11-19 | End: 2021-11-19

## 2021-11-19 RX ADMIN — SODIUM CHLORIDE 1000 MILLILITER(S): 9 INJECTION INTRAMUSCULAR; INTRAVENOUS; SUBCUTANEOUS at 23:33

## 2021-11-19 RX ADMIN — Medication 30 MILLIGRAM(S): at 23:33

## 2021-11-19 NOTE — ED PROVIDER NOTE - CLINICAL SUMMARY MEDICAL DECISION MAKING FREE TEXT BOX
61 year old female with LLQ abd pain radiating down LLE. PE as above.  labs, UA, ct abdomen, pain control, reassess

## 2021-11-19 NOTE — ED PROVIDER NOTE - PATIENT PORTAL LINK FT
You can access the FollowMyHealth Patient Portal offered by [facility] by registering at the following website: [email or web address]. By joining [employer]’s FollowMyHealth portal, you will also be able to view your health information using other applications (apps) compatible with our system.

## 2021-11-19 NOTE — ED PROVIDER NOTE - OBJECTIVE STATEMENT
61 year old female denies PMH coming in complains of LLQ abd pain radiating down LLE. pt states she chronically has this left sided abdominal pain but for the past 2 weeks it has significantly worsened. In the past she states she has had uti's which worsened it but denies dysuria, hematuria. states pain worsened with movement. denies fevers, chills, sweats, cp, sob, palpitations, ha, dizziness, urinary complaints.

## 2021-11-19 NOTE — ED PROVIDER NOTE - PROGRESS NOTE DETAILS
labs with mild leukocytosis. ua dirty catch. ct abdomen 4mm left mi uretal stone. feels well. given dose of abx given possible UTI with stone. rx for abx/pain meds. f/u with urology. return precautions discussed.

## 2021-11-20 LAB
ALBUMIN SERPL ELPH-MCNC: 2.4 G/DL — LOW (ref 3.5–5)
ALP SERPL-CCNC: 70 U/L — SIGNIFICANT CHANGE UP (ref 40–120)
ALT FLD-CCNC: 24 U/L DA — SIGNIFICANT CHANGE UP (ref 10–60)
ANION GAP SERPL CALC-SCNC: 5 MMOL/L — SIGNIFICANT CHANGE UP (ref 5–17)
AST SERPL-CCNC: 26 U/L — SIGNIFICANT CHANGE UP (ref 10–40)
BILIRUB SERPL-MCNC: 0.7 MG/DL — SIGNIFICANT CHANGE UP (ref 0.2–1.2)
BUN SERPL-MCNC: 31 MG/DL — HIGH (ref 7–18)
CALCIUM SERPL-MCNC: 9.5 MG/DL — SIGNIFICANT CHANGE UP (ref 8.4–10.5)
CHLORIDE SERPL-SCNC: 107 MMOL/L — SIGNIFICANT CHANGE UP (ref 96–108)
CO2 SERPL-SCNC: 22 MMOL/L — SIGNIFICANT CHANGE UP (ref 22–31)
CREAT SERPL-MCNC: 1.15 MG/DL — SIGNIFICANT CHANGE UP (ref 0.5–1.3)
GLUCOSE SERPL-MCNC: 105 MG/DL — HIGH (ref 70–99)
LIDOCAIN IGE QN: 352 U/L — SIGNIFICANT CHANGE UP (ref 73–393)
POTASSIUM SERPL-MCNC: 3.5 MMOL/L — SIGNIFICANT CHANGE UP (ref 3.5–5.3)
POTASSIUM SERPL-SCNC: 3.5 MMOL/L — SIGNIFICANT CHANGE UP (ref 3.5–5.3)
PROT SERPL-MCNC: 10.2 G/DL — HIGH (ref 6–8.3)
SODIUM SERPL-SCNC: 134 MMOL/L — LOW (ref 135–145)

## 2021-11-20 PROCEDURE — 36415 COLL VENOUS BLD VENIPUNCTURE: CPT

## 2021-11-20 PROCEDURE — 83690 ASSAY OF LIPASE: CPT

## 2021-11-20 PROCEDURE — 80053 COMPREHEN METABOLIC PANEL: CPT

## 2021-11-20 PROCEDURE — 96374 THER/PROPH/DIAG INJ IV PUSH: CPT | Mod: XU

## 2021-11-20 PROCEDURE — 74177 CT ABD & PELVIS W/CONTRAST: CPT | Mod: MA

## 2021-11-20 PROCEDURE — 87086 URINE CULTURE/COLONY COUNT: CPT

## 2021-11-20 PROCEDURE — 87186 SC STD MICRODIL/AGAR DIL: CPT

## 2021-11-20 PROCEDURE — 81001 URINALYSIS AUTO W/SCOPE: CPT

## 2021-11-20 PROCEDURE — 96375 TX/PRO/DX INJ NEW DRUG ADDON: CPT

## 2021-11-20 PROCEDURE — 99284 EMERGENCY DEPT VISIT MOD MDM: CPT | Mod: 25

## 2021-11-20 PROCEDURE — 85025 COMPLETE CBC W/AUTO DIFF WBC: CPT

## 2021-11-20 PROCEDURE — 74177 CT ABD & PELVIS W/CONTRAST: CPT | Mod: 26,MA

## 2021-11-20 RX ORDER — CEFUROXIME AXETIL 250 MG
1 TABLET ORAL
Qty: 20 | Refills: 0
Start: 2021-11-20 | End: 2021-11-29

## 2021-11-20 RX ORDER — IBUPROFEN 200 MG
1 TABLET ORAL
Qty: 40 | Refills: 0
Start: 2021-11-20 | End: 2021-11-29

## 2021-11-20 RX ORDER — CEFTRIAXONE 500 MG/1
1000 INJECTION, POWDER, FOR SOLUTION INTRAMUSCULAR; INTRAVENOUS ONCE
Refills: 0 | Status: COMPLETED | OUTPATIENT
Start: 2021-11-20 | End: 2021-11-20

## 2021-11-20 RX ORDER — TAMSULOSIN HYDROCHLORIDE 0.4 MG/1
1 CAPSULE ORAL
Qty: 10 | Refills: 0
Start: 2021-11-20 | End: 2021-11-29

## 2021-11-20 RX ADMIN — CEFTRIAXONE 100 MILLIGRAM(S): 500 INJECTION, POWDER, FOR SOLUTION INTRAMUSCULAR; INTRAVENOUS at 03:00

## 2021-11-22 NOTE — ED POST DISCHARGE NOTE - DETAILS
contacted pts son who is caretaker. states pt is doing well on abx, has uro fu. noted ESBL in urine resistant to multiple abx however since pt doing well and cx sensitive to cefoxitin will not prescribe additional abx or have return immediately to ed. strict return precautions provided.

## 2023-07-04 ENCOUNTER — INPATIENT (INPATIENT)
Facility: HOSPITAL | Age: 63
LOS: 1 days | Discharge: HOME | DRG: 419 | End: 2023-07-06
Attending: SURGERY | Admitting: SURGERY
Payer: MEDICAID

## 2023-07-04 ENCOUNTER — TRANSCRIPTION ENCOUNTER (OUTPATIENT)
Age: 63
End: 2023-07-04

## 2023-07-04 VITALS
OXYGEN SATURATION: 99 % | WEIGHT: 141.1 LBS | SYSTOLIC BLOOD PRESSURE: 144 MMHG | TEMPERATURE: 98 F | HEIGHT: 59.45 IN | DIASTOLIC BLOOD PRESSURE: 76 MMHG | RESPIRATION RATE: 19 BRPM | HEART RATE: 54 BPM

## 2023-07-04 DIAGNOSIS — K81.0 ACUTE CHOLECYSTITIS: ICD-10-CM

## 2023-07-04 DIAGNOSIS — Z98.891 HISTORY OF UTERINE SCAR FROM PREVIOUS SURGERY: Chronic | ICD-10-CM

## 2023-07-04 LAB
ALBUMIN SERPL ELPH-MCNC: 3.2 G/DL — LOW (ref 3.5–5)
ALP SERPL-CCNC: 76 U/L — SIGNIFICANT CHANGE UP (ref 40–120)
ALT FLD-CCNC: 16 U/L DA — SIGNIFICANT CHANGE UP (ref 10–60)
ANION GAP SERPL CALC-SCNC: 4 MMOL/L — LOW (ref 5–17)
APPEARANCE UR: ABNORMAL
AST SERPL-CCNC: 16 U/L — SIGNIFICANT CHANGE UP (ref 10–40)
BASOPHILS # BLD AUTO: 0.01 K/UL — SIGNIFICANT CHANGE UP (ref 0–0.2)
BASOPHILS NFR BLD AUTO: 0.1 % — SIGNIFICANT CHANGE UP (ref 0–2)
BILIRUB SERPL-MCNC: 0.3 MG/DL — SIGNIFICANT CHANGE UP (ref 0.2–1.2)
BILIRUB UR-MCNC: NEGATIVE — SIGNIFICANT CHANGE UP
BUN SERPL-MCNC: 14 MG/DL — SIGNIFICANT CHANGE UP (ref 7–18)
CALCIUM SERPL-MCNC: 9.2 MG/DL — SIGNIFICANT CHANGE UP (ref 8.4–10.5)
CHLORIDE SERPL-SCNC: 111 MMOL/L — HIGH (ref 96–108)
CO2 SERPL-SCNC: 24 MMOL/L — SIGNIFICANT CHANGE UP (ref 22–31)
COLOR SPEC: YELLOW — SIGNIFICANT CHANGE UP
CREAT SERPL-MCNC: 0.91 MG/DL — SIGNIFICANT CHANGE UP (ref 0.5–1.3)
DIFF PNL FLD: ABNORMAL
EGFR: 71 ML/MIN/1.73M2 — SIGNIFICANT CHANGE UP
EOSINOPHIL # BLD AUTO: 0.02 K/UL — SIGNIFICANT CHANGE UP (ref 0–0.5)
EOSINOPHIL NFR BLD AUTO: 0.3 % — SIGNIFICANT CHANGE UP (ref 0–6)
GLUCOSE SERPL-MCNC: 143 MG/DL — HIGH (ref 70–99)
GLUCOSE UR QL: NEGATIVE — SIGNIFICANT CHANGE UP
HCT VFR BLD CALC: 35.1 % — SIGNIFICANT CHANGE UP (ref 34.5–45)
HGB BLD-MCNC: 11.2 G/DL — LOW (ref 11.5–15.5)
IMM GRANULOCYTES NFR BLD AUTO: 0.3 % — SIGNIFICANT CHANGE UP (ref 0–0.9)
KETONES UR-MCNC: ABNORMAL
LEUKOCYTE ESTERASE UR-ACNC: ABNORMAL
LIDOCAIN IGE QN: 117 U/L — SIGNIFICANT CHANGE UP (ref 73–393)
LYMPHOCYTES # BLD AUTO: 1.55 K/UL — SIGNIFICANT CHANGE UP (ref 1–3.3)
LYMPHOCYTES # BLD AUTO: 21.2 % — SIGNIFICANT CHANGE UP (ref 13–44)
MCHC RBC-ENTMCNC: 27.1 PG — SIGNIFICANT CHANGE UP (ref 27–34)
MCHC RBC-ENTMCNC: 31.9 GM/DL — LOW (ref 32–36)
MCV RBC AUTO: 84.8 FL — SIGNIFICANT CHANGE UP (ref 80–100)
MONOCYTES # BLD AUTO: 0.42 K/UL — SIGNIFICANT CHANGE UP (ref 0–0.9)
MONOCYTES NFR BLD AUTO: 5.7 % — SIGNIFICANT CHANGE UP (ref 2–14)
NEUTROPHILS # BLD AUTO: 5.29 K/UL — SIGNIFICANT CHANGE UP (ref 1.8–7.4)
NEUTROPHILS NFR BLD AUTO: 72.4 % — SIGNIFICANT CHANGE UP (ref 43–77)
NITRITE UR-MCNC: POSITIVE
NRBC # BLD: 0 /100 WBCS — SIGNIFICANT CHANGE UP (ref 0–0)
PH UR: 5 — SIGNIFICANT CHANGE UP (ref 5–8)
PLATELET # BLD AUTO: 148 K/UL — LOW (ref 150–400)
POTASSIUM SERPL-MCNC: 4 MMOL/L — SIGNIFICANT CHANGE UP (ref 3.5–5.3)
POTASSIUM SERPL-SCNC: 4 MMOL/L — SIGNIFICANT CHANGE UP (ref 3.5–5.3)
PROT SERPL-MCNC: 9.8 G/DL — HIGH (ref 6–8.3)
PROT UR-MCNC: 30 MG/DL
RBC # BLD: 4.14 M/UL — SIGNIFICANT CHANGE UP (ref 3.8–5.2)
RBC # FLD: 14.5 % — SIGNIFICANT CHANGE UP (ref 10.3–14.5)
SODIUM SERPL-SCNC: 139 MMOL/L — SIGNIFICANT CHANGE UP (ref 135–145)
SP GR SPEC: 1.03 — HIGH (ref 1.01–1.02)
TROPONIN I, HIGH SENSITIVITY RESULT: 5.2 NG/L — SIGNIFICANT CHANGE UP
UROBILINOGEN FLD QL: NEGATIVE — SIGNIFICANT CHANGE UP
WBC # BLD: 7.31 K/UL — SIGNIFICANT CHANGE UP (ref 3.8–10.5)
WBC # FLD AUTO: 7.31 K/UL — SIGNIFICANT CHANGE UP (ref 3.8–10.5)

## 2023-07-04 PROCEDURE — 74177 CT ABD & PELVIS W/CONTRAST: CPT | Mod: 26,MA

## 2023-07-04 PROCEDURE — 76705 ECHO EXAM OF ABDOMEN: CPT | Mod: 26

## 2023-07-04 PROCEDURE — 99222 1ST HOSP IP/OBS MODERATE 55: CPT | Mod: 57

## 2023-07-04 PROCEDURE — 99285 EMERGENCY DEPT VISIT HI MDM: CPT

## 2023-07-04 PROCEDURE — 88304 TISSUE EXAM BY PATHOLOGIST: CPT | Mod: 26

## 2023-07-04 RX ORDER — PANTOPRAZOLE SODIUM 20 MG/1
40 TABLET, DELAYED RELEASE ORAL ONCE
Refills: 0 | Status: COMPLETED | OUTPATIENT
Start: 2023-07-04 | End: 2023-07-04

## 2023-07-04 RX ORDER — ENOXAPARIN SODIUM 100 MG/ML
40 INJECTION SUBCUTANEOUS EVERY 24 HOURS
Refills: 0 | Status: DISCONTINUED | OUTPATIENT
Start: 2023-07-04 | End: 2023-07-05

## 2023-07-04 RX ORDER — ACETAMINOPHEN 500 MG
1000 TABLET ORAL EVERY 8 HOURS
Refills: 0 | Status: COMPLETED | OUTPATIENT
Start: 2023-07-04 | End: 2023-07-05

## 2023-07-04 RX ORDER — ONDANSETRON 8 MG/1
8 TABLET, FILM COATED ORAL EVERY 8 HOURS
Refills: 0 | Status: DISCONTINUED | OUTPATIENT
Start: 2023-07-04 | End: 2023-07-06

## 2023-07-04 RX ORDER — ONDANSETRON 8 MG/1
4 TABLET, FILM COATED ORAL ONCE
Refills: 0 | Status: COMPLETED | OUTPATIENT
Start: 2023-07-04 | End: 2023-07-04

## 2023-07-04 RX ORDER — LIDOCAINE 4 G/100G
5 CREAM TOPICAL ONCE
Refills: 0 | Status: COMPLETED | OUTPATIENT
Start: 2023-07-04 | End: 2023-07-04

## 2023-07-04 RX ORDER — MORPHINE SULFATE 50 MG/1
4 CAPSULE, EXTENDED RELEASE ORAL ONCE
Refills: 0 | Status: DISCONTINUED | OUTPATIENT
Start: 2023-07-04 | End: 2023-07-04

## 2023-07-04 RX ORDER — CEFTRIAXONE 500 MG/1
1000 INJECTION, POWDER, FOR SOLUTION INTRAMUSCULAR; INTRAVENOUS ONCE
Refills: 0 | Status: COMPLETED | OUTPATIENT
Start: 2023-07-04 | End: 2023-07-04

## 2023-07-04 RX ORDER — SUCRALFATE 1 G
1 TABLET ORAL ONCE
Refills: 0 | Status: COMPLETED | OUTPATIENT
Start: 2023-07-04 | End: 2023-07-04

## 2023-07-04 RX ORDER — SODIUM CHLORIDE 9 MG/ML
1000 INJECTION INTRAMUSCULAR; INTRAVENOUS; SUBCUTANEOUS ONCE
Refills: 0 | Status: COMPLETED | OUTPATIENT
Start: 2023-07-04 | End: 2023-07-04

## 2023-07-04 RX ORDER — METRONIDAZOLE 500 MG
500 TABLET ORAL EVERY 8 HOURS
Refills: 0 | Status: DISCONTINUED | OUTPATIENT
Start: 2023-07-04 | End: 2023-07-06

## 2023-07-04 RX ORDER — DEXTROSE MONOHYDRATE, SODIUM CHLORIDE, AND POTASSIUM CHLORIDE 50; .745; 4.5 G/1000ML; G/1000ML; G/1000ML
1000 INJECTION, SOLUTION INTRAVENOUS
Refills: 0 | Status: DISCONTINUED | OUTPATIENT
Start: 2023-07-04 | End: 2023-07-04

## 2023-07-04 RX ORDER — CEFPODOXIME PROXETIL 100 MG
1 TABLET ORAL
Qty: 24 | Refills: 0
Start: 2023-07-04 | End: 2023-07-15

## 2023-07-04 RX ORDER — SODIUM CHLORIDE 9 MG/ML
1000 INJECTION, SOLUTION INTRAVENOUS
Refills: 0 | Status: DISCONTINUED | OUTPATIENT
Start: 2023-07-04 | End: 2023-07-06

## 2023-07-04 RX ORDER — KETOROLAC TROMETHAMINE 30 MG/ML
30 SYRINGE (ML) INJECTION EVERY 6 HOURS
Refills: 0 | Status: DISCONTINUED | OUTPATIENT
Start: 2023-07-04 | End: 2023-07-05

## 2023-07-04 RX ORDER — PANTOPRAZOLE SODIUM 20 MG/1
1 TABLET, DELAYED RELEASE ORAL
Qty: 14 | Refills: 0
Start: 2023-07-04 | End: 2023-07-17

## 2023-07-04 RX ORDER — CIPROFLOXACIN LACTATE 400MG/40ML
400 VIAL (ML) INTRAVENOUS EVERY 12 HOURS
Refills: 0 | Status: DISCONTINUED | OUTPATIENT
Start: 2023-07-04 | End: 2023-07-06

## 2023-07-04 RX ADMIN — SODIUM CHLORIDE 100 MILLILITER(S): 9 INJECTION, SOLUTION INTRAVENOUS at 18:32

## 2023-07-04 RX ADMIN — Medication 200 MILLIGRAM(S): at 18:35

## 2023-07-04 RX ADMIN — Medication 30 MILLIGRAM(S): at 18:32

## 2023-07-04 RX ADMIN — Medication 30 MILLIGRAM(S): at 18:50

## 2023-07-04 RX ADMIN — MORPHINE SULFATE 4 MILLIGRAM(S): 50 CAPSULE, EXTENDED RELEASE ORAL at 05:57

## 2023-07-04 RX ADMIN — SODIUM CHLORIDE 1000 MILLILITER(S): 9 INJECTION INTRAMUSCULAR; INTRAVENOUS; SUBCUTANEOUS at 09:11

## 2023-07-04 RX ADMIN — Medication 400 MILLIGRAM(S): at 21:51

## 2023-07-04 RX ADMIN — MORPHINE SULFATE 4 MILLIGRAM(S): 50 CAPSULE, EXTENDED RELEASE ORAL at 08:36

## 2023-07-04 RX ADMIN — PANTOPRAZOLE SODIUM 40 MILLIGRAM(S): 20 TABLET, DELAYED RELEASE ORAL at 12:04

## 2023-07-04 RX ADMIN — Medication 30 MILLILITER(S): at 12:41

## 2023-07-04 RX ADMIN — LIDOCAINE 5 MILLILITER(S): 4 CREAM TOPICAL at 12:42

## 2023-07-04 RX ADMIN — MORPHINE SULFATE 4 MILLIGRAM(S): 50 CAPSULE, EXTENDED RELEASE ORAL at 07:00

## 2023-07-04 RX ADMIN — CEFTRIAXONE 100 MILLIGRAM(S): 500 INJECTION, POWDER, FOR SOLUTION INTRAMUSCULAR; INTRAVENOUS at 10:04

## 2023-07-04 RX ADMIN — Medication 100 MILLIGRAM(S): at 23:02

## 2023-07-04 RX ADMIN — MORPHINE SULFATE 4 MILLIGRAM(S): 50 CAPSULE, EXTENDED RELEASE ORAL at 09:10

## 2023-07-04 RX ADMIN — Medication 1 GRAM(S): at 12:04

## 2023-07-04 RX ADMIN — SODIUM CHLORIDE 1000 MILLILITER(S): 9 INJECTION INTRAMUSCULAR; INTRAVENOUS; SUBCUTANEOUS at 05:57

## 2023-07-04 RX ADMIN — Medication 1000 MILLIGRAM(S): at 23:47

## 2023-07-04 RX ADMIN — ONDANSETRON 4 MILLIGRAM(S): 8 TABLET, FILM COATED ORAL at 05:57

## 2023-07-04 NOTE — H&P ADULT - NSHPPHYSICALEXAM_GEN_ALL_CORE
General:  Appears stated age, well-groomed, mild distress 2/2 abdominal pain  Eyes: EOMI  HENT:  WNL, no JVD  Respirations: Unlabored  Abdomen: soft, non distended, moderate tenderness to palpation on the right side and epigastric region. All remaining quadrants are nontender to palpation. Negative Chua's sign. No voluntary guarding, no rebound tenderness, no palpable masses.  : No suprapubic tenderness. Negative CVAT bilaterally.   Extremities: no edema bilaterally  Skin: warm and dry  Musculoskeletal: no calf tenderness b/l   Psych: normal affect

## 2023-07-04 NOTE — ED ADULT TRIAGE NOTE - TEMPERATURE IN FAHRENHEIT (DEGREES F)
Patient awake and alert with mom at bedside. Patient placed on cardiac monitor for safety. UA was walked to lab. Will continue to closely monitor. 97.6

## 2023-07-04 NOTE — H&P ADULT - NSHPLABSRESULTS_GEN_ALL_CORE
11.2   7.31  )-----------( 148      ( 04 Jul 2023 05:51 )             35.1     07-04    139  |  111<H>  |  14  ----------------------------<  143<H>  4.0   |  24  |  0.91    Ca    9.2      04 Jul 2023 05:51    TPro  9.8<H>  /  Alb  3.2<L>  /  TBili  0.3  /  DBili  x   /  AST  16  /  ALT  16  /  AlkPhos  76  07-04    < from: CT Abdomen and Pelvis w/ IV Cont (07.04.23 @ 09:04)     PROCEDURE:  CT of the Abdomen and Pelvis was performed.  Sagittal and coronal reformats were performed.    FINDINGS:  LOWER CHEST: Within normal limits.    LIVER: Enlarged left hepatic lobe, unchanged. Mild diffuse periportal   edema. No evidence of liver mass.  BILE DUCTS: Normal caliber.  GALLBLADDER: Nondistended gallbladder with questionable minimal   gallbladder wall edema. No radiopaque gallbladder calculi.  SPLEEN: Within normal limits.  PANCREAS: Within normal limits.  ADRENALS: Within normal limits.  KIDNEYS/URETERS: Mildly heterogeneous renal enhancement especially in the   upper and lower pole with cortical atrophy, question possible   pyelonephritis. The right kidney is within normal limits. No   hydronephrosis.    BLADDER: Within normal limits.  REPRODUCTIVE ORGANS: Enlarged, fibroid uterus.    BOWEL: No bowel obstruction. Appendix is normal.  PERITONEUM: No ascites.  VESSELS: Hepatic and portal veins and SMV are patent. Scattered   atherosclerotic changes. No abdominal aortic aneurysm.  RETROPERITONEUM/LYMPH NODES: No lymphadenopathy.  ABDOMINAL WALL: Within normal limits.  BONES: Degenerative changes.    IMPRESSION:  Nondistended gallbladder with questionable minimal gallbladder wall edema   and no radiopaque cholelithiasis to suggest cholecystitis. If there is   high clinical suspicion for such, correlate with abdominal ultrasound.    Heterogeneous left kidney with mild cortical scarring, question   pyelonephritis. Correlate with urinalysis.    Mild diffuse periportal edema, question hydrational.    --- End of Report ---    < from: US Hepatic & Pancreatic (07.04.23 @ 11:04) >      < from: US Hepatic & Pancreatic (07.04.23 @ 11:04) >    TECHNIQUE: Sonography of the right upper quadrant.    FINDINGS:  Liver: Within normal limits.  Bile ducts: Normal caliber. Common bile duct measures 7 mm.  Gallbladder: Underdistended with gallstones and questionable gallbladder   wall thickening. Sonographic Chua's sign could not be accurately   assessed because the patient received pain medication.  Pancreas: Poorly visualized.  Right kidney: 10.2 cm. No hydronephrosis.  Ascites: None.  IVC: Visualized portions are within normal limits.    IMPRESSION:  Underdistended gallbladder containing gallstones. Questionable   gallbladder wall thickening. Sonographic Chua's sign could not be   accurately assessed because the patient received pain medication.   Correlate with HIDA scan if clinically warranted.    < end of copied text >

## 2023-07-04 NOTE — H&P ADULT - HISTORY OF PRESENT ILLNESS
62 y/o female with no past medical history and past surgical history of  x1 (>20 years ago) presents to the ED with epigastric/right sided abdominal pain with multiple episodes of vomiting x 1 day. Pt admits to severe, gnawing abdominal pain after eating large pork meal. Pain was followed by nausea and NBNB vomiting. Pt admits to known gallstones and known "gallbladder attacks" many years ago but never followed up with a surgeon due to the pain always subsiding. Pt admits to recent UTI which was treated with oral antibiotics by her outpatient PCP. Pt denies fever, chills, change in bowel habits. Denies dysuria, pyuria, difficulty urinating, back pain. No other urinary complaints.     Pt is Wolof-speaking, all information obtained through son at bedside.

## 2023-07-04 NOTE — ED ADULT NURSE REASSESSMENT NOTE - NS ED NURSE REASSESS COMMENT FT1
RECEIVED PT ON ROUNDS A&O X 3. C/O RUQ ABDOMINAL PAIN. 10/10. DENIES N/V. ABDOMEN SOFT, NON DISTENDED. HR 46. DR. ARANA NOTIFIED. SEEN AND EVALUATED BY DR. ARANA. MORPHINE 4MG IV GIVEN AS ORDERED. CATSCAN PENDING

## 2023-07-04 NOTE — ED PROVIDER NOTE - OBJECTIVE STATEMENT
63-year-old female with history of asthma and history of  presents with upper abdominal pain and vomiting.  Reports onset 9 PM last night associated with 1 episode of vomiting which she describes as greenish liquid.  Denies diarrhea and reports she had a normal nonbloody bowel movement prior to coming to the ED for evaluation.  Denies any fever, cough, urinary symptoms.  Denies similar symptoms in the past.  Evaluation performed in [place]i language by me.

## 2023-07-04 NOTE — H&P ADULT - ASSESSMENT
64 y/o female a/w acute cholecystitis vs pyelonephritis  VSS, afebrile, no leukocytosis, LFTs/Tbili WNL, UA dirty, Cr WNL    Given US findings, hx of gallstones, and clinical picture. Most likely abdominal pain 2/2 gallstones    Plan   - admit to surgical service under Dr. Armando   - plan for lap vargas later this week   - Keep NPO + IVF   - IV Cipro and Flagyl   - obtain Ucx  - pain / nausea meds prn   - f/u AM labs   - DVT ppx  - discussed with attending and agrees

## 2023-07-04 NOTE — ED PROVIDER NOTE - CLINICAL SUMMARY MEDICAL DECISION MAKING FREE TEXT BOX
63-year-old female with history of asthma and history of  presents with upper abdominal pain and vomiting.    ekg interpretation- sinus bradycardua with HR49  lab interpretation- wbc 7.31K, cmp/lipase wnl, trop wnl, UA pending  CT A/P pending  Discussed above with patient/family  patient signedout to Dr. Campos at 730am. 63-year-old female with history of asthma and history of  presents with upper abdominal pain and vomiting.    ekg interpretation- sinus bradycardia with HR 49  lab interpretation- wbc 7.31K, cmp/lipase wnl, trop wnl, UA pending  CT A/P pending  Discussed above with patient/family  patient signedout to Dr. Campos at 730am.

## 2023-07-04 NOTE — ED ADULT NURSE NOTE - NSFALLUNIVINTERV_ED_ALL_ED
Bed/Stretcher in lowest position, wheels locked, appropriate side rails in place/Call bell, personal items and telephone in reach/Instruct patient to call for assistance before getting out of bed/chair/stretcher/Non-slip footwear applied when patient is off stretcher/[place] to call system/Physically safe environment - no spills, clutter or unnecessary equipment/Purposeful proactive rounding/Room/bathroom lighting operational, light cord in reach

## 2023-07-04 NOTE — ED PROVIDER NOTE - CPE EDP NEURO NORM
"Behavior   Note any precipitants to event or behavior   Describe level and action of any aggressive behavior or speech and associated interventions.     Anxiety: Patient denies at this time  Depression: Patient denies at this time  Pain  0  AVH   no  S/I   no  Plan  no  H/I   no  Plan  no    Affect   euthymic/normal      Note:pt seen at nurses station, has been up on milieu most of evening, interacts well with peers, very talkative.  Pt had extensive conversation with this nurse about future plans, how he feels he has changed, and was excited that he is learning new coping skills.  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  Pt discussed the need to remove toxic people from his life and states that though the change will be sad, he knows that to be himself and learn he has to leave some things behind him.  Pt is encouraged to maintain that line of thinking, but to first and foremost continue treatment via medication compliance once discharged.  Pt remains reluctant to take medication, though states he knows he must because that is what is good for him, states that he is nervous when it comes to medications because he is always afraid to,\"flop out and have a seizure,\" stating that medicine his mom used to force him to take often did that to him.  Pt states he is \"glad I got to come up here and learn a lot, but I sure dont want to stay much longer.\"  Pt often laughs, seeks out staff for further conversation.  Pt denies any issues at this time, denies pain, denies si/hi, denies depression, denies anxiety.  Pt rate of speech did seem less rapid tonight than on previous night.  Will continue to monitor.      Intervention    PRN medication utilized:  no    Instructed in medication usage and effects  Medications administered as ordered  Encouraged to verbalize needs      Response    Verbalized understanding   Did patient take " medications as ordered yes   Did patient interact with assessment?  yes     Plan    Will monitor for safety  Will monitor every 15 minutes as ordered  Will evaluate and promote the plan of care    Last BM:  June 8, 2019  (Please chart in I/O as well)     normal...

## 2023-07-05 ENCOUNTER — TRANSCRIPTION ENCOUNTER (OUTPATIENT)
Age: 63
End: 2023-07-05

## 2023-07-05 LAB
ABO RH CONFIRMATION: SIGNIFICANT CHANGE UP
APTT BLD: 27.7 SEC — SIGNIFICANT CHANGE UP (ref 27.5–35.5)
BLD GP AB SCN SERPL QL: SIGNIFICANT CHANGE UP
INR BLD: 1.09 RATIO — SIGNIFICANT CHANGE UP (ref 0.88–1.16)
PROTHROM AB SERPL-ACNC: 13 SEC — SIGNIFICANT CHANGE UP (ref 10.5–13.4)

## 2023-07-05 PROCEDURE — 47562 LAPAROSCOPIC CHOLECYSTECTOMY: CPT | Mod: AS

## 2023-07-05 PROCEDURE — 47562 LAPAROSCOPIC CHOLECYSTECTOMY: CPT

## 2023-07-05 PROCEDURE — S2900 ROBOTIC SURGICAL SYSTEM: CPT

## 2023-07-05 PROCEDURE — S2900 ROBOTIC SURGICAL SYSTEM: CPT | Mod: NC

## 2023-07-05 DEVICE — LIGATING CLIPS WECK HEMOLOK POLYMER LARGE (PURPLE) 6: Type: IMPLANTABLE DEVICE | Status: FUNCTIONAL

## 2023-07-05 RX ORDER — FENTANYL CITRATE 50 UG/ML
25 INJECTION INTRAVENOUS
Refills: 0 | Status: DISCONTINUED | OUTPATIENT
Start: 2023-07-05 | End: 2023-07-05

## 2023-07-05 RX ORDER — ONDANSETRON 8 MG/1
4 TABLET, FILM COATED ORAL ONCE
Refills: 0 | Status: DISCONTINUED | OUTPATIENT
Start: 2023-07-05 | End: 2023-07-05

## 2023-07-05 RX ORDER — HYDROMORPHONE HYDROCHLORIDE 2 MG/ML
0.5 INJECTION INTRAMUSCULAR; INTRAVENOUS; SUBCUTANEOUS
Refills: 0 | Status: DISCONTINUED | OUTPATIENT
Start: 2023-07-05 | End: 2023-07-05

## 2023-07-05 RX ADMIN — SODIUM CHLORIDE 100 MILLILITER(S): 9 INJECTION, SOLUTION INTRAVENOUS at 17:27

## 2023-07-05 RX ADMIN — Medication 400 MILLIGRAM(S): at 05:18

## 2023-07-05 RX ADMIN — SODIUM CHLORIDE 100 MILLILITER(S): 9 INJECTION, SOLUTION INTRAVENOUS at 21:44

## 2023-07-05 RX ADMIN — Medication 30 MILLIGRAM(S): at 06:00

## 2023-07-05 RX ADMIN — Medication 100 MILLIGRAM(S): at 06:03

## 2023-07-05 RX ADMIN — Medication 1000 MILLIGRAM(S): at 22:02

## 2023-07-05 RX ADMIN — Medication 30 MILLIGRAM(S): at 05:29

## 2023-07-05 RX ADMIN — Medication 100 MILLIGRAM(S): at 21:44

## 2023-07-05 RX ADMIN — Medication 1000 MILLIGRAM(S): at 06:21

## 2023-07-05 RX ADMIN — Medication 400 MILLIGRAM(S): at 21:44

## 2023-07-05 RX ADMIN — Medication 200 MILLIGRAM(S): at 05:33

## 2023-07-05 RX ADMIN — Medication 200 MILLIGRAM(S): at 17:25

## 2023-07-05 NOTE — PATIENT PROFILE ADULT - FUNCTIONAL ASSESSMENT - BASIC MOBILITY 6.
4-calculated by average/Not able to assess (calculate score using [facility] averaging method)

## 2023-07-05 NOTE — PATIENT PROFILE ADULT - FALL HARM RISK - HARM RISK INTERVENTIONS

## 2023-07-06 ENCOUNTER — TRANSCRIPTION ENCOUNTER (OUTPATIENT)
Age: 63
End: 2023-07-06

## 2023-07-06 VITALS
RESPIRATION RATE: 16 BRPM | HEART RATE: 57 BPM | OXYGEN SATURATION: 96 % | DIASTOLIC BLOOD PRESSURE: 51 MMHG | TEMPERATURE: 98 F | SYSTOLIC BLOOD PRESSURE: 123 MMHG

## 2023-07-06 LAB
ANION GAP SERPL CALC-SCNC: 6 MMOL/L — SIGNIFICANT CHANGE UP (ref 5–17)
BUN SERPL-MCNC: 17 MG/DL — SIGNIFICANT CHANGE UP (ref 7–18)
CALCIUM SERPL-MCNC: 8.6 MG/DL — SIGNIFICANT CHANGE UP (ref 8.4–10.5)
CHLORIDE SERPL-SCNC: 110 MMOL/L — HIGH (ref 96–108)
CO2 SERPL-SCNC: 22 MMOL/L — SIGNIFICANT CHANGE UP (ref 22–31)
CREAT SERPL-MCNC: 0.82 MG/DL — SIGNIFICANT CHANGE UP (ref 0.5–1.3)
EGFR: 81 ML/MIN/1.73M2 — SIGNIFICANT CHANGE UP
GLUCOSE SERPL-MCNC: 109 MG/DL — HIGH (ref 70–99)
HCT VFR BLD CALC: 32.9 % — LOW (ref 34.5–45)
HGB BLD-MCNC: 10.5 G/DL — LOW (ref 11.5–15.5)
MCHC RBC-ENTMCNC: 26.6 PG — LOW (ref 27–34)
MCHC RBC-ENTMCNC: 31.9 GM/DL — LOW (ref 32–36)
MCV RBC AUTO: 83.5 FL — SIGNIFICANT CHANGE UP (ref 80–100)
NRBC # BLD: 0 /100 WBCS — SIGNIFICANT CHANGE UP (ref 0–0)
PLATELET # BLD AUTO: 139 K/UL — LOW (ref 150–400)
POTASSIUM SERPL-MCNC: 3.9 MMOL/L — SIGNIFICANT CHANGE UP (ref 3.5–5.3)
POTASSIUM SERPL-SCNC: 3.9 MMOL/L — SIGNIFICANT CHANGE UP (ref 3.5–5.3)
RBC # BLD: 3.94 M/UL — SIGNIFICANT CHANGE UP (ref 3.8–5.2)
RBC # FLD: 14 % — SIGNIFICANT CHANGE UP (ref 10.3–14.5)
SODIUM SERPL-SCNC: 138 MMOL/L — SIGNIFICANT CHANGE UP (ref 135–145)
WBC # BLD: 6.69 K/UL — SIGNIFICANT CHANGE UP (ref 3.8–10.5)
WBC # FLD AUTO: 6.69 K/UL — SIGNIFICANT CHANGE UP (ref 3.8–10.5)

## 2023-07-06 PROCEDURE — 86850 RBC ANTIBODY SCREEN: CPT

## 2023-07-06 PROCEDURE — 85730 THROMBOPLASTIN TIME PARTIAL: CPT

## 2023-07-06 PROCEDURE — 80053 COMPREHEN METABOLIC PANEL: CPT

## 2023-07-06 PROCEDURE — 85610 PROTHROMBIN TIME: CPT

## 2023-07-06 PROCEDURE — 99285 EMERGENCY DEPT VISIT HI MDM: CPT | Mod: 25

## 2023-07-06 PROCEDURE — 96375 TX/PRO/DX INJ NEW DRUG ADDON: CPT

## 2023-07-06 PROCEDURE — 86901 BLOOD TYPING SEROLOGIC RH(D): CPT

## 2023-07-06 PROCEDURE — 80048 BASIC METABOLIC PNL TOTAL CA: CPT

## 2023-07-06 PROCEDURE — S2900: CPT

## 2023-07-06 PROCEDURE — 74177 CT ABD & PELVIS W/CONTRAST: CPT | Mod: MA

## 2023-07-06 PROCEDURE — 87186 SC STD MICRODIL/AGAR DIL: CPT

## 2023-07-06 PROCEDURE — 85025 COMPLETE CBC W/AUTO DIFF WBC: CPT

## 2023-07-06 PROCEDURE — 36415 COLL VENOUS BLD VENIPUNCTURE: CPT

## 2023-07-06 PROCEDURE — 83690 ASSAY OF LIPASE: CPT

## 2023-07-06 PROCEDURE — 88304 TISSUE EXAM BY PATHOLOGIST: CPT

## 2023-07-06 PROCEDURE — 85027 COMPLETE CBC AUTOMATED: CPT

## 2023-07-06 PROCEDURE — 76705 ECHO EXAM OF ABDOMEN: CPT

## 2023-07-06 PROCEDURE — 96376 TX/PRO/DX INJ SAME DRUG ADON: CPT

## 2023-07-06 PROCEDURE — 87086 URINE CULTURE/COLONY COUNT: CPT

## 2023-07-06 PROCEDURE — 93005 ELECTROCARDIOGRAM TRACING: CPT

## 2023-07-06 PROCEDURE — 84484 ASSAY OF TROPONIN QUANT: CPT

## 2023-07-06 PROCEDURE — 96374 THER/PROPH/DIAG INJ IV PUSH: CPT

## 2023-07-06 PROCEDURE — 82962 GLUCOSE BLOOD TEST: CPT

## 2023-07-06 PROCEDURE — 86900 BLOOD TYPING SEROLOGIC ABO: CPT

## 2023-07-06 PROCEDURE — 81001 URINALYSIS AUTO W/SCOPE: CPT

## 2023-07-06 PROCEDURE — C1889: CPT

## 2023-07-06 RX ORDER — CEFUROXIME AXETIL 250 MG
1 TABLET ORAL
Qty: 10 | Refills: 0
Start: 2023-07-06 | End: 2023-07-10

## 2023-07-06 RX ORDER — OXYCODONE HYDROCHLORIDE 5 MG/1
1 TABLET ORAL
Qty: 6 | Refills: 0
Start: 2023-07-06

## 2023-07-06 RX ORDER — METRONIDAZOLE 500 MG
1 TABLET ORAL
Qty: 15 | Refills: 0
Start: 2023-07-06 | End: 2023-07-10

## 2023-07-06 RX ADMIN — Medication 100 MILLIGRAM(S): at 05:18

## 2023-07-06 RX ADMIN — Medication 100 MILLIGRAM(S): at 14:05

## 2023-07-06 RX ADMIN — Medication 200 MILLIGRAM(S): at 05:19

## 2023-07-06 NOTE — DISCHARGE NOTE PROVIDER - NSDCCPCAREPLAN_GEN_ALL_CORE_FT
PRINCIPAL DISCHARGE DIAGNOSIS  Diagnosis: Cholecystitis, acute  Assessment and Plan of Treatment:

## 2023-07-06 NOTE — DISCHARGE NOTE PROVIDER - HOSPITAL COURSE
62 y/o female with no past medical history and past surgical history of  x1 (>20 years ago) presents to the ED with epigastric/right sided abdominal pain with multiple episodes of vomiting x 1 day. Pt admits to severe, gnawing abdominal pain after eating large pork meal. Pain was followed by nausea and NBNB vomiting. Pt admits to known gallstones and known "gallbladder attacks" many years ago but never followed up with a surgeon due to the pain always subsiding. Pt admits to recent UTI which was treated with oral antibiotics by her outpatient PCP. Pt denies fever, chills, change in bowel habits. Denies dysuria, pyuria, difficulty urinating, back pain. No other urinary complaints.   CT abd/ pelvis done in ED and showed:   < from: CT Abdomen and Pelvis w/ IV Cont (23 @ 09:04) >    FINDINGS:  LOWER CHEST: Within normal limits.    LIVER: Enlarged left hepatic lobe, unchanged. Mild diffuse periportal   edema. No evidence of liver mass.  BILE DUCTS: Normal caliber.  GALLBLADDER: Nondistended gallbladder with questionable minimal   gallbladder wall edema. No radiopaque gallbladder calculi.  SPLEEN: Within normal limits.  PANCREAS: Within normal limits.  ADRENALS: Within normal limits.  KIDNEYS/URETERS: Mildly heterogeneous renal enhancement especially in the   upper and lower pole with cortical atrophy, question possible   pyelonephritis. The right kidney is within normal limits. No   hydronephrosis.    BLADDER: Within normal limits.  REPRODUCTIVE ORGANS: Enlarged, fibroid uterus.    BOWEL: No bowel obstruction. Appendix is normal.  PERITONEUM: No ascites.  VESSELS: Hepatic and portal veins and SMV are patent. Scattered   atherosclerotic changes. No abdominal aortic aneurysm.  RETROPERITONEUM/LYMPH NODES: No lymphadenopathy.  ABDOMINAL WALL: Within normal limits.  BONES: Degenerative changes.    IMPRESSION:  Nondistended gallbladder with questionable minimal gallbladder wall edema   and no radiopaque cholelithiasis to suggest cholecystitis. If there is   high clinical suspicion for such, correlate with abdominal ultrasound.    Heterogeneous left kidney with mild cortical scarring, question   pyelonephritis. Correlate with urinalysis.    Mild diffuse periportal edema, question hydrational.    --- End of Report ---    < end of copied text >    < from: US Hepatic & Pancreatic (23 @ 11:04) >    FINDINGS:  Liver: Within normal limits.  Bile ducts: Normal caliber. Common bile duct measures 7 mm.  Gallbladder: Underdistended with gallstones and questionable gallbladder   wall thickening. Sonographic Chua's sign could not be accurately   assessed because the patient received pain medication.  Pancreas: Poorly visualized.  Right kidney: 10.2 cm. No hydronephrosis.  Ascites: None.  IVC: Visualized portions are within normal limits.    IMPRESSION:  Underdistended gallbladder containing gallstones. Questionable   gallbladder wall thickening. Sonographic Chua's sign could not be   accurately assessed because the patient received pain medication.   Correlate with HIDA scan if clinically warranted.    < end of copied text >    Pt admitted to surgical services, underwent Robotic Assisted Laparoscopic Cholecystectomy on 23, post operative period uncomplicated, pt tolerating regular diet well. Pt stable for discharge, pt to follow up with Dr. Armando in office.

## 2023-07-06 NOTE — DISCHARGE NOTE PROVIDER - CARE PROVIDER_API CALL
Gregory Armando.  Surgery  [address] [facility] [address] [address], [address]  [place] [address]  Phone: [phone]  Fax: [phone]  Follow Up Time:

## 2023-07-06 NOTE — DISCHARGE NOTE NURSING/CASE MANAGEMENT/SOCIAL WORK - NSDCPEFALRISK_GEN_ALL_CORE
For information on Fall & Injury Prevention, visit: [email or web address] OR  [email or web address] OR  https://www.cdc.gov/steadi/patient.html

## 2023-07-06 NOTE — PROGRESS NOTE ADULT - ASSESSMENT
63y.o. Female s/p robo-assisted lap vargas POD#0, postop stable    PLAN   - CLD  - IVF  - IV abx   - Pain control prn  - Holding pharmacologic DVT ppx  - Incentive spirometry/ ambulate as tolerated   
63y.o. Female with acute vargas, UTI    -OR today for lap vargas  -Keep NPO except meds  -IVF  -Pain control prn  -DVT ppx  -Incentive spirometry   -f/u Ucx
62 y/o Female s/p Robot Assisted Laparoscopic Cholecystectomy 7/5    -Advance diet to reg   -Pain medication PRN   -OOB/Ambulate   -DVT ppx   -DC home

## 2023-07-06 NOTE — DISCHARGE NOTE PROVIDER - NSDCMRMEDTOKEN_GEN_ALL_CORE_FT
cefuroxime 500 mg oral tablet: 1 tab(s) orally 2 times a day  metroNIDAZOLE 500 mg oral tablet: 1 tab(s) orally 3 times a day  Protonix 40 mg oral delayed release tablet: 1 tab(s) orally once a day

## 2023-07-06 NOTE — DISCHARGE NOTE NURSING/CASE MANAGEMENT/SOCIAL WORK - PATIENT PORTAL LINK FT
You can access the FollowMyHealth Patient Portal offered by [facility] by registering at the following website: [email or web address]. By joining [facility]’s FollowMyHealth portal, you will also be able to view your health information using other applications (apps) compatible with our system.

## 2023-07-06 NOTE — PROGRESS NOTE ADULT - SUBJECTIVE AND OBJECTIVE BOX
INTERVAL HPI/OVERNIGHT EVENTS:    Pt seen and examined at bedside. Offers no acute complaints at this time. Tolerating clear diet well.     Vital Signs Last 24 Hrs  T(C): 36.8 (06 Jul 2023 06:24), Max: 37.3 (05 Jul 2023 16:13)  T(F): 98.2 (06 Jul 2023 06:24), Max: 99.1 (05 Jul 2023 16:13)  HR: 48 (06 Jul 2023 06:24) (48 - 75)  BP: 154/64 (06 Jul 2023 06:24) (81/67 - 156/77)  BP(mean): 92 (05 Jul 2023 15:25) (73 - 99)  RR: 18 (06 Jul 2023 06:24) (14 - 22)  SpO2: 98% (06 Jul 2023 06:24) (97% - 100%)    Parameters below as of 06 Jul 2023 06:24  Patient On (Oxygen Delivery Method): room air      I&O's Detail    aluminum hydroxide/magnesium hydroxide/simethicone Suspension 30 milliLiter(s) Oral every 4 hours PRN  ciprofloxacin   IVPB 400 milliGRAM(s) IV Intermittent every 12 hours  metroNIDAZOLE  IVPB 500 milliGRAM(s) IV Intermittent every 8 hours      Physical Exam  General: AAOx3, No acute distress  Skin: No jaundice, no icterus  Abdomen: soft, nondistended, nontender, no rebound tenderness, no guarding, no palpable masses, dressing c/d/i   Extremities: non edematous, no calf pain bilaterally        Labs:                        10.5   6.69  )-----------( 139      ( 06 Jul 2023 06:50 )             32.9     07-06    138  |  110<H>  |  17  ----------------------------<  x   3.9   |  22  |  0.82        PT/INR - ( 05 Jul 2023 06:15 )   PT: 13.0 sec;   INR: 1.09 ratio         PTT - ( 05 Jul 2023 06:15 )  PTT:27.7 sec  
Physical Therapy Visit    Referred by: Isra Edge MD; Medical Diagnosis (from order):    Diagnosis Information      Diagnosis    719.95 (ICD-9-CM) - M25.852 (ICD-10-CM) - Femoroacetabular impingement of left hip    843.8 (ICD-9-CM) - S73.192A (ICD-10-CM) - Tear of left acetabular labrum              Visit: 11    Visit Type: Daily Treatment Note    SUBJECTIVE                                                                                                               10/17/2022 Patient reports pain at the back of left hip when leaning and bending forward.   Pain / Symptoms:  Pain rating (out of 10): Current: 0 ; Best: 0; Worst: 5  Location: L hip    OBJECTIVE                                                                                                                       Range of Motion (ROM)   (degrees unless noted; active unless noted; norms in ( ); negative=lacking to 0, positive=beyond 0)   Hip:      - Flexion (100-120):        • Left: 120    - Internal Rotation in supine (45-50):        • Left: Passive: 30    - External Rotation in supine (45-50):        • Left: Passive: 30    Strength  (out of 5 unless noted, standard test position unless noted, lbs tested with hand held dynamometer)   Hip:    - Flexion:        • Left: 4    - Extension:        • Left: 4    - Abduction:        • Left: 4+    - Internal Rotation:        • Left: 4+    - External Rotation:        • Left: 4+                TREATMENT                                                                                                                  Therapeutic Exercise:  Prone plank with hip extension  Supine Bridge with Resistance - Green  Monster walks - fwd/back Band around knees  Cable column rotations - 10 lbs  Treadmill side stepping from level surface x 5 minutes up to 0.8 speed    Skilled input: verbal instruction/cues, tactile instruction/cues and facilitation    Writer verbally educated and received verbal consent for hand placement, 
General Surgery Postop Check    Subjective:  Pt resting comfortably in bed, no acute complaints. Pain adequately controlled. Tolerating CLD. Denies CP, SOB, N/V, fevers, chills. Voiding spontaneously.     T(C): 37.3 (07-05-23 @ 16:13), Max: 37.4 (07-05-23 @ 05:52)  HR: 53 (07-05-23 @ 16:13) (45 - 75)  BP: 156/77 (07-05-23 @ 16:13) (81/67 - 156/77)  RR: 18 (07-05-23 @ 16:13) (14 - 22)  SpO2: 97% (07-05-23 @ 16:13) (97% - 100%)    Physical:  Gen: A&O x3  HEENT: NC/AT. Trachea midline.   Respiratory: nonlabored breathing, equal chest rise b/l   Abd: Soft apporpriately tender, nondistended. dressings c/d/i   Extremities: No LE edema, no calf pain b/l   
positioning of patient, and techniques to be performed today from patient for therapist position for techniques, clothing adjustments for techniques and hand placement and palpation for techniques as described above and how they are pertinent to the patient's plan of care.    Home Exercise Program/Education Materials: Access Code: [email or web address]  URL: [email or web address]  Date: 10/17/2022  Prepared by: Bubba Collins    Exercises  · Forward Step Down - 1 x daily - 7 x weekly - 3 sets - 10 reps  · Hooklying Isometric Clamshell - 2 x daily - 7 x weekly - 2 sets - 5 reps - 2 seconds hold  · Supine Bridge with Resistance Band - 1 x daily - 7 x weekly - 1 sets - 10 reps - 1 hold  · Plank with Hip Extension - 1 x daily - 7 x weekly - 1 sets - 10 reps  · Seated Trunk Rotation with Anchored Resistance - 1 x daily - 7 x weekly - 2 sets - 10 reps       ASSESSMENT                                                                                                             Patient is 6 weeks and 4 days s/p surgical arthroscopy of the LEFT hip with revision labral repair, revision femoral neck osteoplasty, acetabuloplasty (DOS 9/1/22). Session focused on progressing core strengthening. Patient able to perform treadmill side stepping without exacerbation of pain. Plan is to continue per protocol.     Patient continues to benefit from skilled PT services to address remaining deficits and promote completion of ADLs at prior level of function.    Patient Education:   Results of above outlined education: Verbalizes understanding and Demonstrates understanding      PLAN                                                                                                                           Suggestions for next session as indicated: Progress per plan of care      GOALS                                                                                                                           Long Term Goals: to be met 
INTERVAL HPI/OVERNIGHT EVENTS:  Pt resting comfortably. c/o dysuria.  NPO.  +flatus.   Denies N/V.    MEDICATIONS  (STANDING):  acetaminophen   IVPB .. 1000 milliGRAM(s) IV Intermittent every 8 hours  ciprofloxacin   IVPB 400 milliGRAM(s) IV Intermittent every 12 hours  enoxaparin Injectable 40 milliGRAM(s) SubCutaneous every 24 hours  lactated ringers. 1000 milliLiter(s) (100 mL/Hr) IV Continuous <Continuous>  metroNIDAZOLE  IVPB 500 milliGRAM(s) IV Intermittent every 8 hours    MEDICATIONS  (PRN):  aluminum hydroxide/magnesium hydroxide/simethicone Suspension 30 milliLiter(s) Oral every 4 hours PRN Dyspepsia  ketorolac   Injectable 30 milliGRAM(s) IV Push every 6 hours PRN Severe Pain (7 - 10)  ondansetron Injectable 8 milliGRAM(s) IV Push every 8 hours PRN Nausea and/or Vomiting    Vital Signs Last 24 Hrs  T(C): 37.4 (05 Jul 2023 05:52), Max: 37.4 (05 Jul 2023 05:52)  T(F): 99.4 (05 Jul 2023 05:52), Max: 99.4 (05 Jul 2023 05:52)  HR: 47 (05 Jul 2023 05:52) (45 - 70)  BP: 147/71 (05 Jul 2023 05:52) (126/76 - 147/71)  BP(mean): 88 (04 Jul 2023 21:21) (88 - 88)  RR: 18 (05 Jul 2023 05:52) (16 - 18)  SpO2: 100% (05 Jul 2023 05:52) (97% - 100%)    Parameters below as of 05 Jul 2023 05:52  Patient On (Oxygen Delivery Method): room air    Physical:  General: A&Ox3. NAD.  Abdomen: Soft nondistended, RLQ tenderness.  Back: No CVAT b/l    LABS:                        11.2   7.31  )-----------( 148      ( 04 Jul 2023 05:51 )             35.1             07-04    139  |  111<H>  |  14  ----------------------------<  143<H>  4.0   |  24  |  0.91    Ca    9.2      04 Jul 2023 05:51    TPro  9.8<H>  /  Alb  3.2<L>  /  TBili  0.3  /  DBili  x   /  AST  16  /  ALT  16  /  AlkPhos  76  07-04    Urine Microscopic-Add On (NC) (07.04.23 @ 09:10)    Squamous Epithelial Cells: Occasional /HPF   Bacteria: Moderate /HPF   Red Blood Cell - Urine: 2-5 /HPF   White Blood Cell - Urine: 6-10 /HPF    Urinalysis (07.04.23 @ 09:10)    pH Urine: 5.0   Glucose Qualitative, Urine: Negative   Blood, Urine: Small   Color: Yellow   Urine Appearance: Slightly Turbid   Bilirubin: Negative   Ketone - Urine: Trace   Specific Gravity: 1.030   Protein, Urine: 30 mg/dL   Urobilinogen: Negative   Nitrite: Positive   Leukocyte Esterase Concentration: Trace    
by end of plan of care  1. Lower Extremity Functional Scale: Patient will complete form to reflect an improved raw score to greater than or equal to 75/80 to indicate patient reported improvement in function/disability/impairment (minimal detectable change: 9 points). Status: progressing/ongoing  2. Patient will ambulate without reported difficulty no assistive device Status: progressing/ongoing  3. Patient will ascend and descend 1 flight of steps and without reported difficulty for safe access to home/bedroom Status: met   4. Patient will be independent with progressed and modified home exercise program.  Status: met       Therapy procedure time and total treatment time can be found documented on the Time Entry flowsheet  
n/a

## 2023-07-06 NOTE — PROGRESS NOTE ADULT - NSPROGADDITIONALINFOA_GEN_ALL_CORE
Pain in the upper abdomen on and off for several years but got worse now  Patient admitted with Cholelithiasis and acute cholecystitis. Radiology studies reviewed. Tender RUQ abdomen. Robotic Laparoscopic cholecystectomy possible open was discussed. The potential for bleeding, CBD injury, bowel injury and other related complications were discussed. All the questions were answered.  Informed consent for Robotic assisted laparoscopic cholecystectomy possible open surgery was obtained   service used  No flank tenderness
pt doing well post op  stable for d/c

## 2023-07-07 LAB
-  AMIKACIN: SIGNIFICANT CHANGE UP
-  AMOXICILLIN/CLAVULANIC ACID: SIGNIFICANT CHANGE UP
-  AMPICILLIN/SULBACTAM: SIGNIFICANT CHANGE UP
-  AMPICILLIN: SIGNIFICANT CHANGE UP
-  AZTREONAM: SIGNIFICANT CHANGE UP
-  CEFAZOLIN: SIGNIFICANT CHANGE UP
-  CEFEPIME: SIGNIFICANT CHANGE UP
-  CEFTRIAXONE: SIGNIFICANT CHANGE UP
-  CEFUROXIME: SIGNIFICANT CHANGE UP
-  CIPROFLOXACIN: SIGNIFICANT CHANGE UP
-  ERTAPENEM: SIGNIFICANT CHANGE UP
-  GENTAMICIN: SIGNIFICANT CHANGE UP
-  IMIPENEM: SIGNIFICANT CHANGE UP
-  LEVOFLOXACIN: SIGNIFICANT CHANGE UP
-  MEROPENEM: SIGNIFICANT CHANGE UP
-  NITROFURANTOIN: SIGNIFICANT CHANGE UP
-  PIPERACILLIN/TAZOBACTAM: SIGNIFICANT CHANGE UP
-  TOBRAMYCIN: SIGNIFICANT CHANGE UP
-  TRIMETHOPRIM/SULFAMETHOXAZOLE: SIGNIFICANT CHANGE UP
CULTURE RESULTS: SIGNIFICANT CHANGE UP
METHOD TYPE: SIGNIFICANT CHANGE UP
ORGANISM # SPEC MICROSCOPIC CNT: SIGNIFICANT CHANGE UP
ORGANISM # SPEC MICROSCOPIC CNT: SIGNIFICANT CHANGE UP
SPECIMEN SOURCE: SIGNIFICANT CHANGE UP

## 2023-07-11 LAB — SURGICAL PATHOLOGY STUDY: SIGNIFICANT CHANGE UP

## 2023-07-13 ENCOUNTER — APPOINTMENT (OUTPATIENT)
Dept: SURGERY | Facility: CLINIC | Age: 63
End: 2023-07-13
Payer: MEDICAID

## 2023-07-13 DIAGNOSIS — K81.0 ACUTE CHOLECYSTITIS: ICD-10-CM

## 2023-07-13 DIAGNOSIS — Z87.891 PERSONAL HISTORY OF NICOTINE DEPENDENCE: ICD-10-CM

## 2023-07-13 DIAGNOSIS — J45.909 UNSPECIFIED ASTHMA, UNCOMPLICATED: ICD-10-CM

## 2023-07-13 DIAGNOSIS — Z78.9 OTHER SPECIFIED HEALTH STATUS: ICD-10-CM

## 2023-07-13 PROCEDURE — 99024 POSTOP FOLLOW-UP VISIT: CPT

## 2023-07-13 NOTE — ASSESSMENT
[FreeTextEntry1] : Ms. CAMEJO is a 63 year y/o F, with S/P Robotic-assisted laparoscopic cholecystectomy, 07/05/23. \par \par Patient is doing well, with excellent post-operative recovery. All surgical incisions are healing well and as expected. There is no evidence of infection or complication, and patient is progressing as expected.\par

## 2023-07-13 NOTE — PHYSICAL EXAM
[No Rash or Lesion] : No rash or lesion [Alert] : alert [Oriented to Person] : oriented to person [Oriented to Place] : oriented to place [Oriented to Time] : oriented to time [Calm] : calm [de-identified] : EOMI; sclera anicteric. [de-identified] : A/Ox3; NAD. appears comfortable [de-identified] : Abdomen soft and non tender. Wounds healing well. Port sites with no erythema or drainage.

## 2023-07-13 NOTE — HISTORY OF PRESENT ILLNESS
[de-identified] : Ms. CAMEJO is a 63 year y/o F who was admitted to [facility] with acute cholecystitis, S/P Robotic-assisted laparoscopic cholecystectomy, 07/05/23. \par Patient is without reported complaints. Denies abdominal pain. No nausea/vomiting. No fevers/chills. Patient is tolerating a regular diet with normal appetite. Normal BM's.\par No urinary complaints. No dysuria.

## 2023-07-13 NOTE — PLAN
[FreeTextEntry1] : copy of urine culture results provided to the patient\par she is asymptomatic\par follow up with PCP \par \par Post operative wound care, activity and restrictions/precautions were reinforced. \par Patient was instructed to refrain from any heavy lifting >10-15 lbs for at least 4 weeks post operatively. \par \par Patient's questions and concerns addressed.\par \par patient will follow up if needed. Warning signs, follow up, and restrictions were discussed with the patient.\par

## 2023-07-13 NOTE — REASON FOR VISIT
[Post Op: _________] : a [unfilled] post op visit [Other: _____] : [unfilled] [FreeTextEntry1] : S/P Robotic-assisted laparoscopic cholecystectomy, 07/05/23

## 2024-03-26 ENCOUNTER — EMERGENCY (EMERGENCY)
Facility: HOSPITAL | Age: 64
LOS: 1 days | Discharge: HOME | End: 2024-03-26
Attending: STUDENT IN AN ORGANIZED HEALTH CARE EDUCATION/TRAINING PROGRAM
Payer: MEDICAID

## 2024-03-26 VITALS
DIASTOLIC BLOOD PRESSURE: 69 MMHG | TEMPERATURE: 98 F | OXYGEN SATURATION: 99 % | SYSTOLIC BLOOD PRESSURE: 111 MMHG | RESPIRATION RATE: 18 BRPM | HEART RATE: 60 BPM

## 2024-03-26 VITALS
HEART RATE: 99 BPM | SYSTOLIC BLOOD PRESSURE: 126 MMHG | OXYGEN SATURATION: 98 % | DIASTOLIC BLOOD PRESSURE: 71 MMHG | TEMPERATURE: 97 F | WEIGHT: 139.99 LBS | RESPIRATION RATE: 20 BRPM

## 2024-03-26 DIAGNOSIS — Z98.891 HISTORY OF UTERINE SCAR FROM PREVIOUS SURGERY: Chronic | ICD-10-CM

## 2024-03-26 PROCEDURE — 71046 X-RAY EXAM CHEST 2 VIEWS: CPT | Mod: 26

## 2024-03-26 PROCEDURE — 93005 ELECTROCARDIOGRAM TRACING: CPT

## 2024-03-26 PROCEDURE — 99284 EMERGENCY DEPT VISIT MOD MDM: CPT

## 2024-03-26 PROCEDURE — 73030 X-RAY EXAM OF SHOULDER: CPT

## 2024-03-26 PROCEDURE — 99284 EMERGENCY DEPT VISIT MOD MDM: CPT | Mod: 25

## 2024-03-26 PROCEDURE — 71046 X-RAY EXAM CHEST 2 VIEWS: CPT

## 2024-03-26 PROCEDURE — 73030 X-RAY EXAM OF SHOULDER: CPT | Mod: 26,LT

## 2024-03-26 PROCEDURE — 72125 CT NECK SPINE W/O DYE: CPT | Mod: MC

## 2024-03-26 PROCEDURE — 72125 CT NECK SPINE W/O DYE: CPT | Mod: 26,MC

## 2024-03-26 RX ORDER — ACETAMINOPHEN 500 MG
975 TABLET ORAL ONCE
Refills: 0 | Status: COMPLETED | OUTPATIENT
Start: 2024-03-26 | End: 2024-03-26

## 2024-03-26 RX ORDER — METHOCARBAMOL 500 MG/1
1500 TABLET, FILM COATED ORAL ONCE
Refills: 0 | Status: COMPLETED | OUTPATIENT
Start: 2024-03-26 | End: 2024-03-26

## 2024-03-26 RX ORDER — IBUPROFEN 200 MG
1 TABLET ORAL
Qty: 20 | Refills: 0
Start: 2024-03-26

## 2024-03-26 RX ORDER — METHOCARBAMOL 500 MG/1
2 TABLET, FILM COATED ORAL
Qty: 24 | Refills: 0
Start: 2024-03-26

## 2024-03-26 RX ORDER — IBUPROFEN 200 MG
600 TABLET ORAL ONCE
Refills: 0 | Status: COMPLETED | OUTPATIENT
Start: 2024-03-26 | End: 2024-03-26

## 2024-03-26 RX ADMIN — Medication 600 MILLIGRAM(S): at 10:39

## 2024-03-26 RX ADMIN — Medication 600 MILLIGRAM(S): at 15:41

## 2024-03-26 RX ADMIN — Medication 975 MILLIGRAM(S): at 15:41

## 2024-03-26 RX ADMIN — METHOCARBAMOL 1500 MILLIGRAM(S): 500 TABLET, FILM COATED ORAL at 10:39

## 2024-03-26 RX ADMIN — Medication 975 MILLIGRAM(S): at 10:39

## 2024-03-26 NOTE — ED PROVIDER NOTE - NSFOLLOWUPCLINICS_GEN_ALL_ED_FT
[facility]  Orthopedics  [address] [place] [address]  Phone: [phone]  Fax: [phone]

## 2024-03-26 NOTE — ED PROVIDER NOTE - PHYSICAL EXAMINATION
CONSTITUTIONAL: non-toxic, uncomfortable appearing  SKIN: no rash, no petechiae.  EYES: pink conjunctiva, anicteric  NECK: Supple; no meningismus, no JVD  CARD: RRR, no murmurs, equal radial pulses bilaterally 2+  RESP: CTAB, no respiratory distress  ABD: Soft, non-tender, non-distended, no peritoneal signs, no CVA tenderness  EXT: FROM wrist and hand, pain with shoulder movement, no erythema or skin changes, compartments soft.   SPINE: no midline bony tenderness  NEURO: Alert, oriented. Neuro exam nonfocal.   PSYCH: Cooperative, appropriate.

## 2024-03-26 NOTE — ED PROVIDER NOTE - NSFOLLOWUPINSTRUCTIONS_ED_ALL_ED_FT
Tendinitis calcificada  Calcific Tendinitis       La tendinitis calcificada ocurre cuando se depositan oniel de calcio en un tendón. Los tendones son tejidos rosangela, similares a cordones, que conectan los músculos con los huesos. Los tendones son gage parte importante de las articulaciones. Permiten que la articulación se mueva y absorben gran parte de la tensión que gage articulación recibe carter el uso.    Cuando el calcio se deposita en el tendón, luzma se vuelve rígido, duele y se puede hinchar. La tendinitis calcificada ocurre con frecuencia en un tendón del manguito rotador. El manguito rotador es un luisa de músculos y tendones de la articulación del hombro.    ¿Cuáles son las causas?  La causa de la tendinitis calcificada no se conoce. Puede asociarse a:    Uso excesivo del tendón, por movimientos repetitivos por ejemplo.  Exceso de tensión en el tendón.  Desgaste relacionado con la edad.  Lesiones leves y repetidas.    ¿Qué incrementa el riesgo?  Es más probable que esta afección se manifieste en:    Personas que hacen alguna actividad que implica movimientos repetitivos.  Personas de edad avanzada.    ¿Cuáles son los signos o síntomas?  Los síntomas de esta afección incluyen:    Dolor. Esta afección puede causar dolor o no. Cuando hay dolor, luzma puede ocurrir al  la articulación.  Sensibilidad al ejercer presión en el tendón.  Lynn de estallido al  la articulación.  Movimientos limitados de la articulación.  Dificultad para dormir por dolor en la articulación.    ¿Cómo se diagnostica?  Esta afección se diagnostica mediante un examen físico. También pueden hacerle estudios, por ejemplo:    Radiografías.  Resonancia magnética (RM).  Exploración por tomografía computarizada (TC).    ¿Cómo se trata?  Esta afección generalmente mejora jennifer. El tratamiento también puede incluir lo siguiente:    Reposo, hielo, aplicación de presión (compresión), y levantamiento (elevación) de la jolly por encima del nivel del corazón. Soso se conoce vinayak terapia RHCE.  Aplicación de calor en la jolly afectada.  Medicamentos que ayuden a reducir la inflamación o el dolor.  Fisioterapia para fortalecer y estirar el tendón.  Seguir un programa de ejercicios específicos para que la articulación funcione adecuadamente.    En casos graves, el tratamiento puede incluir:    Inyección de corticoesteroides o analgésicos en la articulación o alrededor de la misma.  Manipulación de la articulación después de administrarle medicamentos para adormecer el área (anestesia local).  Inflar la articulación con líquido estéril para aumentar la flexibilidad de los tendones.  Recibir terapia de ondas de choque, que consiste en enfocar ondas de lynn hacia la articulación.    Si otros tratamientos no funcionan, podría ser necesario realizar gage cirugía para limpiar los depósitos de calcio y reparar el tendón. La mayoría de las personas no necesita cirugía.    Siga estas instrucciones en hidalgo casa:      Control del dolor, la rigidez y la hinchazón      Si se lo indican, aplique hielo sobre la jolly afectada. Para hacer esto:    Ponga el hielo en gage bolsa plástica.  Coloque gage toalla entre la piel y la bolsa.  Aplique el hielo carter 20 minutos, 2 a 3 veces por día.  Si se lo indican, aplique calor en la jolly afectada antes de hacer ejercicios o tan frecuentemente vinayak se lo haya indicado el médico. Use la nomi de calor que el médico le recomiende, vinayak gage compresa de calor húmedo o gage almohadilla térmica.    Coloque gage toalla entre la piel y la nomi de calor.  Aplique calor carter 20 a 30 minutos.  Retire la nomi de calor si la piel se pone de color camilo brillante. Soso es especialmente importante si no puede sentir dolor, calor o frío. Puede correr un riesgo mayor de sufrir quemaduras.  Mueva los dedos de la mano afectada o del pie afectado con frecuencia. Soso puede ayudar a reducir la rigidez y la hinchazón.  Cuando esté sentado o acostado, levante (eleve) la jolly lesionada por encima del nivel del corazón.        Medicamentos    Corunna los medicamentos de venta patrick y los recetados solamente vinayak se lo haya indicado el médico.  Pregúntele al médico si el medicamento recetado le impide conducir o usar maquinaria pesada.        Instrucciones generales    No consuma ningún producto que contenga nicotina o tabaco, vinayak cigarrillos, cigarrillos electrónicos y tabaco de mascar. Estos pueden retrasar la recuperación. Si necesita ayuda para dejar de consumir estos productos, consulte al médico.  Siga las recomendaciones del médico acerca de la actividad y el ejercicio. Pregúntele al médico qué actividades son seguras para usted.  Evite usar la jolly afectada mientras experimenta síntomas de tendinitis.  Use gage venda elástica o de compresión vinayak se lo haya indicado el médico.  Concurra a todas las visitas de seguimiento vinayak se lo haya indicado el médico. Soso es importante.    Comuníquese con un médico si:  Siente dolor o adormecimiento que empeora.  Presenta gage nueva debilidad.  Nota un aumento de la rigidez en la articulación o la sensación de que esta se afloja.  Observa un aumento del enrojecimiento, hinchazón o dolor en la articulación afectada.  Tiene fiebre y los síntomas empeoran repentinamente.    Resumen  La tendinitis calcificada ocurre cuando se depositan oniel de calcio en un tendón.  La tendinitis calcificada ocurre con frecuencia en un tendón del manguito rotador, que es un luisa de músculos y tendones de la articulación del hombro.  Esta afección puede causar dolor o no.  Esta afección generalmente mejora jennifer.  El tratamiento puede incluir reposo, hielo, medicamentos, fisioterapia y, en ocasiones infrecuentes, cirugía.

## 2024-03-26 NOTE — ED PROVIDER NOTE - CLINICAL SUMMARY MEDICAL DECISION MAKING FREE TEXT BOX
Ghazal: 64-year-old female with no prior past medical history who presents with left-sided neck/arm pain x 3 days.  Patient reports atraumatic pain radiating from left side of her neck down her left shoulder and left upper extremity to her wrist over the past 3 days.  Denies any preceding injury or trauma.  Patient states pain is worse with shoulder movement.  Denies any fevers, chest pain, shortness of breath, numbness, or rash.  Patient states she took ibuprofen and ampicillin yesterday with temporary relief.  Physical exam per above. Pain MSK in nature, reproducible on exam, no fevers or systemic symptoms. Likely cervical radiculopathy vs. less likely rotator cuff strain. Plan includes imaging, supportive treatment with dispo pending workup.

## 2024-03-26 NOTE — ED PROVIDER NOTE - PROGRESS NOTE DETAILS
Ghazal: pt seen and re-evaluated at bedside using  #[id number].  Pt states their symptoms have improved.  Pt comfortable in NAD.  Discussed imaging findings with pt. Discussed ortho follow up, return precautions, pt understood and agreeable with plan.

## 2024-03-26 NOTE — ED PROVIDER NOTE - OBJECTIVE STATEMENT
#[id number]    64-year-old female with no prior past medical history who presents with left-sided neck/arm pain x 3 days.  Patient reports atraumatic pain radiating from left side of her neck down her left shoulder and left upper extremity to her wrist over the past 3 days.  Denies any preceding injury or trauma.  Patient states pain is worse with shoulder movement.  Denies any fevers, chest pain, shortness of breath, numbness, or rash.  Patient states she took ibuprofen and ampicillin yesterday with temporary relief.  Denies additional complaints.

## 2024-03-26 NOTE — ED PROVIDER NOTE - PATIENT PORTAL LINK FT
You can access the FollowMyHealth Patient Portal offered by [facility] by registering at the following website: [email or web address]. By joining [employer]’s FollowMyHealth portal, you will also be able to view your health information using other applications (apps) compatible with our system.

## 2024-03-26 NOTE — ED ADULT NURSE NOTE - NSFALLHARMRISKINTERV_ED_ALL_ED

## 2024-06-17 NOTE — ED ADULT NURSE NOTE - TEMPLATE LIST FOR HEAD TO TOE ASSESSMENT
Attempted to call patient secondary to no show- no answer/ unable to leave voicemail. Will offer appointment next visit   Abdominal Pain, N/V/D

## (undated) DEVICE — TROCAR SURGIQUEST AIRSEAL 12MMX100MM

## (undated) DEVICE — PACK ROBOTIC

## (undated) DEVICE — SUT POLYSORB 0 30" GU-46

## (undated) DEVICE — D HELP - CLEARVIEW CLEARIFY SYSTEM

## (undated) DEVICE — XI ARM PERMANENT CAUTERY HOOK

## (undated) DEVICE — ELCTR BOVIE BLADE 3/4" EXTENDED LENGTH 6"

## (undated) DEVICE — SUT MONOCRYL 4-0 27" PS-2 UNDYED

## (undated) DEVICE — FOR-ESU VALLEYLAB T7E14996DX: Type: DURABLE MEDICAL EQUIPMENT

## (undated) DEVICE — DRAPE TOWEL BLUE STICKY

## (undated) DEVICE — TUBING STRYKEFLOW II SUCTION / IRRIGATOR

## (undated) DEVICE — SUT VICRYL 0 27" CT-2 UNDYED

## (undated) DEVICE — CLEANER FOR ELCTR TIP

## (undated) DEVICE — XI OBTURATOR OPTICAL BLADELESS 8MM

## (undated) DEVICE — ENDOCATCH 10MM SPECIMEN POUCH

## (undated) DEVICE — XI SEAL UNIV 5- 8 MM

## (undated) DEVICE — TUBING CONNECTING 6MM 20FT

## (undated) DEVICE — Device

## (undated) DEVICE — DRAPE XL SHEET 77X98"

## (undated) DEVICE — XI DRAPE COLUMN

## (undated) DEVICE — ELCTR CORD FOOTSWITCH 1PLR LAPSCP 10FT